# Patient Record
Sex: FEMALE | Race: BLACK OR AFRICAN AMERICAN | Employment: FULL TIME | URBAN - METROPOLITAN AREA
[De-identification: names, ages, dates, MRNs, and addresses within clinical notes are randomized per-mention and may not be internally consistent; named-entity substitution may affect disease eponyms.]

---

## 2017-02-03 ENCOUNTER — OP HISTORICAL/CONVERTED ENCOUNTER (OUTPATIENT)
Dept: OTHER | Age: 62
End: 2017-02-03

## 2017-08-02 ENCOUNTER — OP HISTORICAL/CONVERTED ENCOUNTER (OUTPATIENT)
Dept: OTHER | Age: 62
End: 2017-08-02

## 2017-09-18 ENCOUNTER — OP HISTORICAL/CONVERTED ENCOUNTER (OUTPATIENT)
Dept: OTHER | Age: 62
End: 2017-09-18

## 2017-10-02 ENCOUNTER — OP HISTORICAL/CONVERTED ENCOUNTER (OUTPATIENT)
Dept: OTHER | Age: 62
End: 2017-10-02

## 2017-11-01 ENCOUNTER — OP HISTORICAL/CONVERTED ENCOUNTER (OUTPATIENT)
Dept: OTHER | Age: 62
End: 2017-11-01

## 2017-12-01 ENCOUNTER — OP HISTORICAL/CONVERTED ENCOUNTER (OUTPATIENT)
Dept: OTHER | Age: 62
End: 2017-12-01

## 2018-08-22 ENCOUNTER — OP HISTORICAL/CONVERTED ENCOUNTER (OUTPATIENT)
Dept: OTHER | Age: 63
End: 2018-08-22

## 2019-09-03 ENCOUNTER — OP HISTORICAL/CONVERTED ENCOUNTER (OUTPATIENT)
Dept: OTHER | Age: 64
End: 2019-09-03

## 2019-09-23 ENCOUNTER — OP HISTORICAL/CONVERTED ENCOUNTER (OUTPATIENT)
Dept: OTHER | Age: 64
End: 2019-09-23

## 2019-10-02 ENCOUNTER — OP HISTORICAL/CONVERTED ENCOUNTER (OUTPATIENT)
Dept: OTHER | Age: 64
End: 2019-10-02

## 2019-12-03 ENCOUNTER — OP HISTORICAL/CONVERTED ENCOUNTER (OUTPATIENT)
Dept: OTHER | Age: 64
End: 2019-12-03

## 2020-01-14 ENCOUNTER — OP HISTORICAL/CONVERTED ENCOUNTER (OUTPATIENT)
Dept: OTHER | Age: 65
End: 2020-01-14

## 2020-02-03 ENCOUNTER — OP HISTORICAL/CONVERTED ENCOUNTER (OUTPATIENT)
Dept: OTHER | Age: 65
End: 2020-02-03

## 2020-07-18 VITALS
SYSTOLIC BLOOD PRESSURE: 130 MMHG | BODY MASS INDEX: 33.82 KG/M2 | RESPIRATION RATE: 18 BRPM | OXYGEN SATURATION: 98 % | HEIGHT: 65 IN | DIASTOLIC BLOOD PRESSURE: 78 MMHG | WEIGHT: 203 LBS | HEART RATE: 66 BPM

## 2020-07-18 PROBLEM — H92.09 OTALGIA: Status: ACTIVE | Noted: 2020-07-18

## 2020-07-18 PROBLEM — J34.3 HYPERTROPHY OF NASAL TURBINATES: Status: ACTIVE | Noted: 2020-07-18

## 2020-07-18 PROBLEM — J30.1 ALLERGIC RHINITIS DUE TO POLLEN: Status: ACTIVE | Noted: 2020-07-18

## 2020-07-18 PROBLEM — H61.20 IMPACTED CERUMEN: Status: ACTIVE | Noted: 2020-07-18

## 2020-07-18 PROBLEM — R11.0 NAUSEA: Status: ACTIVE | Noted: 2020-07-18

## 2020-07-18 PROBLEM — H81.09 MENIERE'S DISEASE: Status: ACTIVE | Noted: 2020-07-18

## 2020-07-18 PROBLEM — G43.109 MIGRAINE WITH VERTIGO: Status: ACTIVE | Noted: 2020-07-18

## 2020-07-18 PROBLEM — H90.3 ASYMMETRICAL SENSORINEURAL HEARING LOSS: Status: ACTIVE | Noted: 2020-07-18

## 2020-07-18 RX ORDER — ONDANSETRON 4 MG/1
4 TABLET, FILM COATED ORAL
COMMUNITY
End: 2021-03-17

## 2020-07-18 RX ORDER — MOMETASONE FUROATE 50 UG/1
2 SPRAY, METERED NASAL DAILY
COMMUNITY
End: 2021-03-17

## 2020-07-18 RX ORDER — AZELASTINE 1 MG/ML
1 SPRAY, METERED NASAL 2 TIMES DAILY
COMMUNITY
End: 2021-09-03

## 2020-07-18 RX ORDER — SIMVASTATIN 40 MG/1
TABLET, FILM COATED ORAL
COMMUNITY

## 2020-07-18 RX ORDER — LEVOCETIRIZINE DIHYDROCHLORIDE 5 MG/1
TABLET, FILM COATED ORAL
COMMUNITY
End: 2020-10-13 | Stop reason: SDUPTHER

## 2020-07-18 RX ORDER — ANASTROZOLE 1 MG/1
1 TABLET ORAL DAILY
COMMUNITY

## 2020-07-18 RX ORDER — BENZONATATE 100 MG/1
100 CAPSULE ORAL
COMMUNITY

## 2020-07-18 RX ORDER — TRIAMTERENE/HYDROCHLOROTHIAZID 37.5-25 MG
TABLET ORAL DAILY
COMMUNITY

## 2020-07-18 RX ORDER — SYRINGE WITH NEEDLE, 1 ML 28GX1/2"
SYRINGE, EMPTY DISPOSABLE MISCELLANEOUS
COMMUNITY

## 2020-07-18 RX ORDER — HYDROCHLOROTHIAZIDE 12.5 MG/1
12.5 CAPSULE ORAL DAILY
COMMUNITY

## 2020-07-18 RX ORDER — MECLIZINE HCL 12.5 MG 12.5 MG/1
TABLET ORAL
COMMUNITY
End: 2020-08-25 | Stop reason: SDUPTHER

## 2020-07-18 RX ORDER — OMEPRAZOLE 20 MG/1
20 CAPSULE, DELAYED RELEASE ORAL DAILY
COMMUNITY

## 2020-07-18 RX ORDER — DESLORATADINE 2.5 MG/1
2.5 TABLET, ORALLY DISINTEGRATING ORAL DAILY
COMMUNITY
End: 2020-10-13 | Stop reason: SDUPTHER

## 2020-07-21 ENCOUNTER — OFFICE VISIT (OUTPATIENT)
Dept: ENT CLINIC | Age: 65
End: 2020-07-21
Payer: MEDICARE

## 2020-07-21 VITALS
BODY MASS INDEX: 33.82 KG/M2 | OXYGEN SATURATION: 98 % | TEMPERATURE: 97.3 F | HEIGHT: 65 IN | WEIGHT: 203 LBS | RESPIRATION RATE: 18 BRPM | HEART RATE: 66 BPM

## 2020-07-21 DIAGNOSIS — J30.1 SEASONAL ALLERGIC RHINITIS DUE TO POLLEN: Primary | ICD-10-CM

## 2020-07-21 PROCEDURE — 95117 IMMUNOTHERAPY INJECTIONS: CPT | Performed by: OTOLARYNGOLOGY

## 2020-07-23 NOTE — PROGRESS NOTES
Patannabellamt seen 07/22/20 for vial test, patient administered 0.02ml, 1 vial, SANTI 7 mm reaction

## 2020-08-24 ENCOUNTER — PATIENT MESSAGE (OUTPATIENT)
Dept: ENT CLINIC | Age: 65
End: 2020-08-24

## 2020-08-25 RX ORDER — MECLIZINE HCL 12.5 MG 12.5 MG/1
25 TABLET ORAL
Qty: 180 TAB | Refills: 1 | Status: SHIPPED | OUTPATIENT
Start: 2020-08-25 | End: 2020-11-23

## 2020-08-25 NOTE — TELEPHONE ENCOUNTER
Zohaib Doan LPN 2/95/3910 6:54 AM EDT      ----- Message -----  From: Kayy Mast  Sent: 8/25/2020 9:30 AM EDT  To: Rep Nurse  Subject: FW: Prescription Question       ----- Message -----  From: Oksana Brown  Sent: 8/24/2020 5:53 PM EDT  To: [facility] Mychart Admin Pool  Subject: Prescription Question     I need a refill on meclizine sent to [facility].  Almost out

## 2020-09-16 ENCOUNTER — TELEPHONE (OUTPATIENT)
Dept: ENT CLINIC | Age: 65
End: 2020-09-16

## 2020-09-16 NOTE — TELEPHONE ENCOUNTER
Called patient and notified her that her serum has been remixed. Scheduled an appointment for patient to come in next week.

## 2020-09-25 ENCOUNTER — OFFICE VISIT (OUTPATIENT)
Dept: ENT CLINIC | Age: 65
End: 2020-09-25
Payer: MEDICARE

## 2020-09-25 VITALS — SYSTOLIC BLOOD PRESSURE: 140 MMHG | DIASTOLIC BLOOD PRESSURE: 72 MMHG

## 2020-09-25 DIAGNOSIS — J30.1 SEASONAL ALLERGIC RHINITIS DUE TO POLLEN: Primary | ICD-10-CM

## 2020-09-25 PROCEDURE — 95115 IMMUNOTHERAPY ONE INJECTION: CPT | Performed by: OTOLARYNGOLOGY

## 2020-10-12 ENCOUNTER — TELEPHONE (OUTPATIENT)
Dept: ENT CLINIC | Age: 65
End: 2020-10-12

## 2020-10-13 RX ORDER — LEVOCETIRIZINE DIHYDROCHLORIDE 5 MG/1
5 TABLET, FILM COATED ORAL DAILY
Qty: 90 TAB | Refills: 1 | Status: SHIPPED | OUTPATIENT
Start: 2020-10-13 | End: 2021-03-15

## 2020-11-20 ENCOUNTER — OFFICE VISIT (OUTPATIENT)
Dept: ENT CLINIC | Age: 65
End: 2020-11-20
Payer: MEDICARE

## 2020-11-20 VITALS
TEMPERATURE: 97.8 F | RESPIRATION RATE: 18 BRPM | HEIGHT: 65 IN | HEART RATE: 85 BPM | BODY MASS INDEX: 33.82 KG/M2 | WEIGHT: 203 LBS | SYSTOLIC BLOOD PRESSURE: 120 MMHG | OXYGEN SATURATION: 97 % | DIASTOLIC BLOOD PRESSURE: 76 MMHG

## 2020-11-20 DIAGNOSIS — J34.3 HYPERTROPHY OF NASAL TURBINATES: ICD-10-CM

## 2020-11-20 DIAGNOSIS — H81.02 MENIERE'S DISEASE OF LEFT EAR: Primary | ICD-10-CM

## 2020-11-20 DIAGNOSIS — H61.23 BILATERAL IMPACTED CERUMEN: ICD-10-CM

## 2020-11-20 DIAGNOSIS — J30.1 NON-SEASONAL ALLERGIC RHINITIS DUE TO POLLEN: ICD-10-CM

## 2020-11-20 PROCEDURE — 69210 REMOVE IMPACTED EAR WAX UNI: CPT | Performed by: OTOLARYNGOLOGY

## 2020-11-20 NOTE — LETTER
11/20/20 Patient: Aletha Weeks YOB: 1955 Date of Visit: 11/20/2020 Letitia Ng, [address] [address] VIA Facsimile: [phone] Dear Letitia Ng MD, Thank you for referring Ms. Viridiana Boone to [facility] EAR, NOSE, THROAT AND ALLERGY CARE for evaluation. My notes for this consultation are attached. If you have questions, please do not hesitate to call me. I look forward to following your patient along with you. Sincerely, Ileana Balderas MD

## 2020-11-20 NOTE — PROGRESS NOTES
Subjective:    Loura Congo   72 y.o.   1955       f/u Meniere disease  pt has overall been doing ok  still on daily dyazide and nightly meclizine 12.5mg  occ woozy feeling but no major flares of vertigo  does c/o some mild sinus tightness, mild PND    had another flare of laryngitis last week  better now  this tends to occur 2-3 times/year    update 6/27/19 - pt had allergy testing today; here to discuss results    update 12/12/19 - 6 mos f/u pt has been doing overall well; she has been dx with breast CA having additional surgery then XRT; has been stable from allergy shots and her MD    11/20/20 - nearly 1 year f/u MD and allergies, pt is on IT, overall doing well, MD is under good control on daily dyazide and meclizine 12.5 daily      Review of Systems  Review of Systems   Constitutional: Negative for chills and fever. HENT: Positive for congestion and hearing loss. Negative for ear pain, nosebleeds and tinnitus. Eyes: Negative for blurred vision and double vision. Respiratory: Negative for cough, sputum production and shortness of breath. Cardiovascular: Negative for chest pain and palpitations. Gastrointestinal: Negative for heartburn, nausea and vomiting. Musculoskeletal: Negative for joint pain and neck pain. Skin: Negative. Neurological: Positive for dizziness. Negative for speech change, weakness and headaches. Endo/Heme/Allergies: Positive for environmental allergies. Does not bruise/bleed easily. Psychiatric/Behavioral: Negative for memory loss. The patient does not have insomnia.           Past Medical History:   Diagnosis Date    Allergic rhinitis due to pollen 7/18/2020    Asymmetrical sensorineural hearing loss 7/18/2020    History of multiple allergies     Hypertrophy of nasal turbinates 7/18/2020    Impacted cerumen 7/18/2020    Meniere's disease 7/18/2020    Migraine with vertigo 7/18/2020    Nausea 7/18/2020    Otalgia 7/18/2020     Prior to Admission medications    Medication Sig Start Date End Date Taking? Authorizing Provider   levocetirizine (XYZAL) 5 mg tablet Take 1 Tab by mouth daily. 10/13/20   Jesusita De La Rosa MD   meclizine (ANTIVERT) 12.5 mg tablet Take 2 Tabs by mouth two (2) times daily as needed for Dizziness for up to 90 days. 8/25/20 11/23/20  Jesusita De La Rosa MD   anastrozole (ARIMIDEX) 1 mg tablet Take 1 mg by mouth daily. Provider, Historical   azelastine (ASTELIN) 137 mcg (0.1 %) nasal spray 1 Spray two (2) times a day. Use in each nostril as directed    Provider, Historical   Syringe with Needle,Disp, Tray (Allergist Tray 1/2cc 27Gx1/2\") 1/2 mL 27 x 1/2\" tray by Does Not Apply route. Provider, Historical   benzonatate (TESSALON) 100 mg capsule Take 100 mg by mouth three (3) times daily as needed for Cough. Provider, Historical   EPINEPHrine 0.15 mg/0.3 mL syrg by Injection route. Provider, Historical   hydroCHLOROthiazide (MICROZIDE) 12.5 mg capsule Take 12.5 mg by mouth daily. Provider, Historical   mometasone (NASONEX) 50 mcg/actuation nasal spray 2 Sprays daily. Provider, Historical   omeprazole (PRILOSEC) 20 mg capsule Take 20 mg by mouth daily. Provider, Historical   ondansetron hcl (ZOFRAN) 4 mg tablet Take 4 mg by mouth every eight (8) hours as needed for Nausea or Vomiting. Provider, Historical   simvastatin (ZOCOR) 40 mg tablet Take  by mouth nightly. Provider, Historical   triamterene-hydroCHLOROthiazide (MAXZIDE) 37.5-25 mg per tablet Take  by mouth daily. Provider, Historical   rivaroxaban (Xarelto) 10 mg tablet Take  by mouth daily. Provider, Historical            Objective:     Visit Vitals  /76 (BP 1 Location: Left arm, BP Patient Position: Sitting)   Pulse 85   Temp 97.8 °F (36.6 °C) (Oral)   Resp 18   Ht 5' 4.5\" (1.638 m)   Wt 203 lb (92.1 kg)   SpO2 97%   BMI 34.31 kg/m²        Physical Exam  Vitals signs reviewed. Constitutional:       General: She is awake. She is not in acute distress. Appearance: Normal appearance. She is well-groomed. She is obese. HENT:      Head: Normocephalic and atraumatic. Jaw: There is normal jaw occlusion. No trismus, tenderness or malocclusion. Salivary Glands: Right salivary gland is not diffusely enlarged or tender. Left salivary gland is not diffusely enlarged or tender. Right Ear: Tympanic membrane, ear canal and external ear normal. Decreased hearing noted. A PE tube is present. Left Ear: Tympanic membrane, ear canal and external ear normal. Decreased hearing noted. A PE tube is present. Ears:      Waters exam findings: does not lateralize. Right Rinne: AC > BC. Left Rinne: AC > BC. Nose: No nasal deformity, septal deviation or mucosal edema. Right Nostril: No epistaxis. Left Nostril: No epistaxis. Right Turbinates: Enlarged and swollen. Not pale. Left Turbinates: Enlarged and swollen. Not pale. Right Sinus: No maxillary sinus tenderness or frontal sinus tenderness. Left Sinus: No maxillary sinus tenderness or frontal sinus tenderness. Mouth/Throat:      Lips: Pink. No lesions. Mouth: Mucous membranes are moist. No oral lesions. Dentition: Normal dentition. No dental caries. Tongue: No lesions. Palate: No mass and lesions. Pharynx: Oropharynx is clear. Uvula midline. No oropharyngeal exudate or posterior oropharyngeal erythema. Tonsils: No tonsillar exudate. 0 on the right. 0 on the left. Eyes:      General: Vision grossly intact. Extraocular Movements: Extraocular movements intact. Right eye: No nystagmus. Left eye: No nystagmus. Conjunctiva/sclera: Conjunctivae normal.      Pupils: Pupils are equal, round, and reactive to light. Neck:      Musculoskeletal: No edema or erythema. Thyroid: No thyroid mass, thyromegaly or thyroid tenderness. Trachea: Trachea and phonation normal. No tracheal tenderness or tracheal deviation. Cardiovascular:      Rate and Rhythm: Normal rate and regular rhythm. Pulmonary:      Effort: Pulmonary effort is normal. No respiratory distress. Breath sounds: No stridor. Musculoskeletal: Normal range of motion. General: No swelling or tenderness. Lymphadenopathy:      Cervical: No cervical adenopathy. Skin:     General: Skin is warm and dry. Findings: No lesion or rash. Neurological:      General: No focal deficit present. Mental Status: She is alert and oriented to person, place, and time. Mental status is at baseline. Cranial Nerves: Cranial nerves are intact. Coordination: Romberg sign negative. Gait: Gait is intact. Comments: Negative Hallpike   Psychiatric:         Mood and Affect: Mood normal.         Behavior: Behavior normal. Behavior is cooperative. Procedure - Removal Impacted Cerumen    Indications: cerumen impaction    Ears are examined under microscope/headlight.  bilateral ears are cleaned using otologic curette, suction, and/or alligator forceps. Assessment/Plan:     Encounter Diagnoses   Name Primary?  Meniere's disease of left ear Yes    Non-seasonal allergic rhinitis due to pollen     Hypertrophy of nasal turbinates     Bilateral impacted cerumen      MD is stable, cont current tx  Cont IT and breakthrough meds  Ears cleaned AU  Fu 6 mos    No orders of the defined types were placed in this encounter. Follow-up and Dispositions    · Return in about 6 months (around 5/20/2021).

## 2020-12-15 ENCOUNTER — TELEPHONE (OUTPATIENT)
Dept: ENT CLINIC | Age: 65
End: 2020-12-15

## 2020-12-15 NOTE — TELEPHONE ENCOUNTER
Spoke with patient informed her serum will be made Friday, she made an appointment for Tuesday @ 11:15 am.

## 2020-12-22 ENCOUNTER — OFFICE VISIT (OUTPATIENT)
Dept: ENT CLINIC | Age: 65
End: 2020-12-22
Payer: MEDICARE

## 2020-12-22 VITALS — DIASTOLIC BLOOD PRESSURE: 80 MMHG | TEMPERATURE: 97.1 F | SYSTOLIC BLOOD PRESSURE: 120 MMHG

## 2020-12-22 DIAGNOSIS — J30.1 NON-SEASONAL ALLERGIC RHINITIS DUE TO POLLEN: Primary | ICD-10-CM

## 2020-12-22 PROCEDURE — 95115 IMMUNOTHERAPY ONE INJECTION: CPT | Performed by: OTOLARYNGOLOGY

## 2021-02-23 ENCOUNTER — TELEPHONE (OUTPATIENT)
Dept: ENT CLINIC | Age: 66
End: 2021-02-23

## 2021-02-25 ENCOUNTER — TELEPHONE (OUTPATIENT)
Dept: ENT CLINIC | Age: 66
End: 2021-02-25

## 2021-03-01 ENCOUNTER — TELEPHONE (OUTPATIENT)
Dept: ENT CLINIC | Age: 66
End: 2021-03-01

## 2021-03-01 NOTE — TELEPHONE ENCOUNTER
Called patient, DAPHNEY saying 2:15 March 17th could work for vial test. If that works she can give the office back a call and place her at 2:15, and if not she can give a call back for schedule for a new time/date.

## 2021-03-12 ENCOUNTER — HOSPITAL ENCOUNTER (OUTPATIENT)
Dept: RADIATION THERAPY | Age: 66
Discharge: HOME | End: 2021-03-12

## 2021-03-15 RX ORDER — LEVOCETIRIZINE DIHYDROCHLORIDE 5 MG/1
TABLET, FILM COATED ORAL
Qty: 90 TAB | Refills: 1 | Status: SHIPPED | OUTPATIENT
Start: 2021-03-15 | End: 2021-09-03

## 2021-03-17 ENCOUNTER — OFFICE VISIT (OUTPATIENT)
Dept: ENT CLINIC | Age: 66
End: 2021-03-17
Payer: MEDICARE

## 2021-03-17 VITALS
DIASTOLIC BLOOD PRESSURE: 80 MMHG | RESPIRATION RATE: 18 BRPM | OXYGEN SATURATION: 98 % | HEART RATE: 90 BPM | SYSTOLIC BLOOD PRESSURE: 148 MMHG | TEMPERATURE: 96.9 F

## 2021-03-17 DIAGNOSIS — J30.1 NON-SEASONAL ALLERGIC RHINITIS DUE TO POLLEN: Primary | ICD-10-CM

## 2021-03-17 PROCEDURE — 95115 IMMUNOTHERAPY ONE INJECTION: CPT | Performed by: OTOLARYNGOLOGY

## 2021-03-17 NOTE — PROGRESS NOTES
Visit Vitals  BP (!) 148/80 (BP 1 Location: Right arm, BP Patient Position: Sitting, BP Cuff Size: Adult)   Pulse 90   Temp 96.9 °F (36.1 °C) (Temporal)   Resp 18   SpO2 98%       Patient received 0.02ml of allergy serum from vial one in the [facility]. Patient had a 7mm reaction.

## 2021-03-18 DIAGNOSIS — H81.02 MENIERE'S DISEASE OF LEFT EAR: Primary | ICD-10-CM

## 2021-03-18 RX ORDER — MECLIZINE HCL 12.5 MG 12.5 MG/1
12.5 TABLET ORAL
Qty: 180 TAB | Refills: 1 | Status: SHIPPED | OUTPATIENT
Start: 2021-03-18 | End: 2022-07-11 | Stop reason: SDUPTHER

## 2021-05-21 ENCOUNTER — TELEPHONE (OUTPATIENT)
Dept: ENT CLINIC | Age: 66
End: 2021-05-21

## 2021-05-21 ENCOUNTER — OFFICE VISIT (OUTPATIENT)
Dept: ENT CLINIC | Age: 66
End: 2021-05-21
Payer: MEDICARE

## 2021-05-21 VITALS
OXYGEN SATURATION: 98 % | TEMPERATURE: 97.2 F | SYSTOLIC BLOOD PRESSURE: 124 MMHG | WEIGHT: 203 LBS | RESPIRATION RATE: 18 BRPM | DIASTOLIC BLOOD PRESSURE: 80 MMHG | HEART RATE: 74 BPM | BODY MASS INDEX: 33.82 KG/M2 | HEIGHT: 65 IN

## 2021-05-21 DIAGNOSIS — H81.02 MENIERE'S DISEASE OF LEFT EAR: ICD-10-CM

## 2021-05-21 DIAGNOSIS — J34.3 HYPERTROPHY OF NASAL TURBINATES: ICD-10-CM

## 2021-05-21 DIAGNOSIS — J30.1 NON-SEASONAL ALLERGIC RHINITIS DUE TO POLLEN: Primary | ICD-10-CM

## 2021-05-21 DIAGNOSIS — H90.3 ASYMMETRICAL SENSORINEURAL HEARING LOSS: ICD-10-CM

## 2021-05-21 DIAGNOSIS — H61.23 BILATERAL IMPACTED CERUMEN: ICD-10-CM

## 2021-05-21 PROCEDURE — 99214 OFFICE O/P EST MOD 30 MIN: CPT | Performed by: OTOLARYNGOLOGY

## 2021-05-21 PROCEDURE — G8510 SCR DEP NEG, NO PLAN REQD: HCPCS | Performed by: OTOLARYNGOLOGY

## 2021-05-21 PROCEDURE — 3017F COLORECTAL CA SCREEN DOC REV: CPT | Performed by: OTOLARYNGOLOGY

## 2021-05-21 PROCEDURE — G8417 CALC BMI ABV UP PARAM F/U: HCPCS | Performed by: OTOLARYNGOLOGY

## 2021-05-21 PROCEDURE — 69210 REMOVE IMPACTED EAR WAX UNI: CPT | Performed by: OTOLARYNGOLOGY

## 2021-05-21 PROCEDURE — G8400 PT W/DXA NO RESULTS DOC: HCPCS | Performed by: OTOLARYNGOLOGY

## 2021-05-21 PROCEDURE — 1090F PRES/ABSN URINE INCON ASSESS: CPT | Performed by: OTOLARYNGOLOGY

## 2021-05-21 PROCEDURE — 1101F PT FALLS ASSESS-DOCD LE1/YR: CPT | Performed by: OTOLARYNGOLOGY

## 2021-05-21 PROCEDURE — G8536 NO DOC ELDER MAL SCRN: HCPCS | Performed by: OTOLARYNGOLOGY

## 2021-05-21 PROCEDURE — G8427 DOCREV CUR MEDS BY ELIG CLIN: HCPCS | Performed by: OTOLARYNGOLOGY

## 2021-05-21 NOTE — PROGRESS NOTES
Subjective:    Alley Edmond   77 y.o.   1955     Followup visit    Original HPI  f/u Meniere disease  pt has overall been doing ok  still on daily dyazide and nightly meclizine 12.5mg  occ woozy feeling but no major flares of vertigo  does c/o some mild sinus tightness, mild PND    had another flare of laryngitis last week  better now  this tends to occur 2-3 times/year    Followup HPI   update 6/27/19 - pt had allergy testing today; here to discuss results    update 12/12/19 - 6 mos f/u pt has been doing overall well; she has been dx with breast CA having additional surgery then XRT; has been stable from allergy shots and her MD    11/20/20 - nearly 1 year f/u MD and allergies, pt is on IT, overall doing well, MD is under good control on daily dyazide and meclizine 12.5 daily    5/21/20 - 6 mos f/u - pt doing well on IT; still on buildup; on daily dyazide, and meclizine 12.5mg nightly    Review of Systems  Review of Systems   Constitutional: Negative for chills and fever. HENT: Positive for congestion and hearing loss. Negative for ear pain, nosebleeds and tinnitus. Eyes: Negative for blurred vision and double vision. Respiratory: Negative for cough, sputum production and shortness of breath. Cardiovascular: Negative for chest pain and palpitations. Gastrointestinal: Negative for heartburn, nausea and vomiting. Musculoskeletal: Negative for joint pain and neck pain. Skin: Negative. Neurological: Positive for dizziness. Negative for speech change, weakness and headaches. Endo/Heme/Allergies: Positive for environmental allergies. Does not bruise/bleed easily. Psychiatric/Behavioral: Negative for memory loss. The patient does not have insomnia.           Past Medical History:   Diagnosis Date    Allergic rhinitis due to pollen 7/18/2020    Asymmetrical sensorineural hearing loss 7/18/2020    History of multiple allergies     Hypertrophy of nasal turbinates 7/18/2020    Impacted cerumen 7/18/2020    Meniere's disease 7/18/2020    Migraine with vertigo 7/18/2020    Nausea 7/18/2020    Otalgia 7/18/2020     Prior to Admission medications    Medication Sig Start Date End Date Taking? Authorizing Provider   meclizine (ANTIVERT) 12.5 mg tablet Take 1 Tab by mouth three (3) times daily as needed for Dizziness. 3/18/21   Riccardo Johnson, MD   levocetirizine (XYZAL) 5 mg tablet TAKE 1 TABLET BY MOUTH  DAILY 3/15/21   Riccardo Johnson, MD   anastrozole (ARIMIDEX) 1 mg tablet Take 1 mg by mouth daily. Provider, Historical   azelastine (ASTELIN) 137 mcg (0.1 %) nasal spray 1 Spray two (2) times a day. Use in each nostril as directed    Provider, Historical   Syringe with Needle,Disp, Tray (Allergist Tray 1/2cc 27Gx1/2\") 1/2 mL 27 x 1/2\" tray by Does Not Apply route. Provider, Historical   benzonatate (TESSALON) 100 mg capsule Take 100 mg by mouth three (3) times daily as needed for Cough. Provider, Historical   EPINEPHrine 0.15 mg/0.3 mL syrg by Injection route. Provider, Historical   hydroCHLOROthiazide (MICROZIDE) 12.5 mg capsule Take 12.5 mg by mouth daily. Provider, Historical   omeprazole (PRILOSEC) 20 mg capsule Take 20 mg by mouth daily. Provider, Historical   simvastatin (ZOCOR) 40 mg tablet Take  by mouth nightly. Provider, Historical   triamterene-hydroCHLOROthiazide (MAXZIDE) 37.5-25 mg per tablet Take  by mouth daily. Provider, Historical   rivaroxaban (Xarelto) 10 mg tablet Take  by mouth daily. Provider, Historical            Objective:     Visit Vitals  /80 (BP 1 Location: Left upper arm, BP Patient Position: Sitting, BP Cuff Size: Adult)   Pulse 74   Temp 97.2 °F (36.2 °C) (Temporal)   Resp 18   Ht 5' 4.5\" (1.638 m)   Wt 203 lb (92.1 kg)   SpO2 98%   BMI 34.31 kg/m²        Physical Exam  Vitals reviewed. Constitutional:       General: She is awake. She is not in acute distress. Appearance: Normal appearance. She is well-groomed. She is obese.    HENT: Head: Normocephalic and atraumatic. Jaw: There is normal jaw occlusion. No trismus, tenderness or malocclusion. Salivary Glands: Right salivary gland is not diffusely enlarged or tender. Left salivary gland is not diffusely enlarged or tender. Right Ear: Tympanic membrane, ear canal and external ear normal. Decreased hearing noted. There is impacted cerumen. Left Ear: Tympanic membrane, ear canal and external ear normal. Decreased hearing noted. There is impacted cerumen. Ears:      Waters exam findings: does not lateralize. Right Rinne: AC > BC. Left Rinne: AC > BC. Nose: No nasal deformity, septal deviation or mucosal edema. Right Nostril: No epistaxis. Left Nostril: No epistaxis. Right Turbinates: Enlarged and swollen. Not pale. Left Turbinates: Enlarged and swollen. Not pale. Right Sinus: No maxillary sinus tenderness or frontal sinus tenderness. Left Sinus: No maxillary sinus tenderness or frontal sinus tenderness. Mouth/Throat:      Lips: Pink. No lesions. Mouth: Mucous membranes are moist. No oral lesions. Dentition: Normal dentition. No dental caries. Tongue: No lesions. Palate: No mass and lesions. Pharynx: Oropharynx is clear. Uvula midline. No oropharyngeal exudate or posterior oropharyngeal erythema. Tonsils: No tonsillar exudate. 0 on the right. 0 on the left. Eyes:      General: Vision grossly intact. Extraocular Movements: Extraocular movements intact. Right eye: No nystagmus. Left eye: No nystagmus. Conjunctiva/sclera: Conjunctivae normal.      Pupils: Pupils are equal, round, and reactive to light. Neck:      Thyroid: No thyroid mass, thyromegaly or thyroid tenderness. Trachea: Trachea and phonation normal. No tracheal tenderness or tracheal deviation. Cardiovascular:      Rate and Rhythm: Normal rate and regular rhythm.    Pulmonary:      Effort: Pulmonary effort is normal. No respiratory distress. Breath sounds: No stridor. Musculoskeletal:         General: No swelling or tenderness. Normal range of motion. Cervical back: No edema or erythema. Lymphadenopathy:      Cervical: No cervical adenopathy. Skin:     General: Skin is warm and dry. Findings: No lesion or rash. Neurological:      General: No focal deficit present. Mental Status: She is alert and oriented to person, place, and time. Mental status is at baseline. Cranial Nerves: Cranial nerves are intact. Coordination: Romberg sign negative. Gait: Gait is intact. Psychiatric:         Mood and Affect: Mood normal.         Behavior: Behavior normal. Behavior is cooperative. Procedure - Removal Impacted Cerumen    Indications: cerumen impaction    Ears are examined under microscope/headlight.  bilateral ears are cleaned using otologic curette, suction, and/or alligator forceps. Assessment/Plan:     Encounter Diagnoses   Name Primary?  Non-seasonal allergic rhinitis due to pollen Yes    Hypertrophy of nasal turbinates     Asymmetrical sensorineural hearing loss     Bilateral impacted cerumen     Meniere's disease of left ear      MD is stable, cont current tx  Cont IT and breakthrough meds; should be able to go to biweekly once she finished the next 10 week escalation. Ears cleaned AU  Fu 6 mos    Orders Placed This Encounter    REMOVE IMPACTED EAR WAX     Follow-up and Dispositions    · Return in about 6 months (around 11/21/2021).

## 2021-05-21 NOTE — PROGRESS NOTES
Visit Vitals  /80 (BP 1 Location: Left upper arm, BP Patient Position: Sitting, BP Cuff Size: Adult)   Pulse 74   Temp 97.2 °F (36.2 °C) (Temporal)   Resp 18   Ht 5' 4.5\" (1.638 m)   Wt 203 lb (92.1 kg)   SpO2 98%   BMI 34.31 kg/m²     Chief Complaint   Patient presents with    Follow-up     Non-seasonal allergic rhinitis due to pollen

## 2021-06-04 ENCOUNTER — OFFICE VISIT (OUTPATIENT)
Dept: ENT CLINIC | Age: 66
End: 2021-06-04
Payer: MEDICARE

## 2021-06-04 VITALS — DIASTOLIC BLOOD PRESSURE: 78 MMHG | HEART RATE: 85 BPM | SYSTOLIC BLOOD PRESSURE: 140 MMHG | OXYGEN SATURATION: 98 %

## 2021-06-04 DIAGNOSIS — J30.1 NON-SEASONAL ALLERGIC RHINITIS DUE TO POLLEN: Primary | ICD-10-CM

## 2021-06-04 PROCEDURE — 95115 IMMUNOTHERAPY ONE INJECTION: CPT | Performed by: OTOLARYNGOLOGY

## 2021-08-10 ENCOUNTER — TELEPHONE (OUTPATIENT)
Dept: ENT CLINIC | Age: 66
End: 2021-08-10

## 2021-08-31 ENCOUNTER — OFFICE VISIT (OUTPATIENT)
Dept: ENT CLINIC | Age: 66
End: 2021-08-31
Payer: MEDICARE

## 2021-08-31 VITALS — DIASTOLIC BLOOD PRESSURE: 74 MMHG | HEART RATE: 76 BPM | SYSTOLIC BLOOD PRESSURE: 130 MMHG | OXYGEN SATURATION: 97 %

## 2021-08-31 DIAGNOSIS — J30.89 ALLERGIC RHINITIS DUE TO OTHER ALLERGIC TRIGGER, UNSPECIFIED SEASONALITY: Primary | ICD-10-CM

## 2021-08-31 PROCEDURE — 95115 IMMUNOTHERAPY ONE INJECTION: CPT | Performed by: OTOLARYNGOLOGY

## 2021-09-03 RX ORDER — AZELASTINE 1 MG/ML
SPRAY, METERED NASAL
Qty: 1 EACH | Refills: 3 | Status: SHIPPED | OUTPATIENT
Start: 2021-09-03 | End: 2022-02-18 | Stop reason: SDUPTHER

## 2021-09-03 RX ORDER — LEVOCETIRIZINE DIHYDROCHLORIDE 5 MG/1
TABLET, FILM COATED ORAL
Qty: 90 TABLET | Refills: 1 | Status: SHIPPED | OUTPATIENT
Start: 2021-09-03 | End: 2022-01-05

## 2021-09-17 ENCOUNTER — HOSPITAL ENCOUNTER (OUTPATIENT)
Dept: RADIATION THERAPY | Age: 66
Discharge: HOME | End: 2021-09-17

## 2021-10-08 ENCOUNTER — OFFICE VISIT (OUTPATIENT)
Dept: ENT CLINIC | Age: 66
End: 2021-10-08
Payer: MEDICARE

## 2021-10-08 VITALS — HEART RATE: 79 BPM | DIASTOLIC BLOOD PRESSURE: 72 MMHG | OXYGEN SATURATION: 97 % | SYSTOLIC BLOOD PRESSURE: 122 MMHG

## 2021-10-08 DIAGNOSIS — J30.89 ALLERGIC RHINITIS DUE TO OTHER ALLERGIC TRIGGER, UNSPECIFIED SEASONALITY: Primary | ICD-10-CM

## 2021-10-08 PROCEDURE — 95115 IMMUNOTHERAPY ONE INJECTION: CPT | Performed by: NURSE PRACTITIONER

## 2021-10-22 ENCOUNTER — OFFICE VISIT (OUTPATIENT)
Dept: ENT CLINIC | Age: 66
End: 2021-10-22
Payer: MEDICARE

## 2021-10-22 DIAGNOSIS — J30.9 ALLERGIC RHINITIS, UNSPECIFIED SEASONALITY, UNSPECIFIED TRIGGER: Primary | ICD-10-CM

## 2021-10-22 PROCEDURE — 95115 IMMUNOTHERAPY ONE INJECTION: CPT | Performed by: NURSE PRACTITIONER

## 2021-11-05 ENCOUNTER — OFFICE VISIT (OUTPATIENT)
Dept: ENT CLINIC | Age: 66
End: 2021-11-05
Payer: MEDICARE

## 2021-11-05 VITALS — HEART RATE: 70 BPM | OXYGEN SATURATION: 99 % | SYSTOLIC BLOOD PRESSURE: 116 MMHG | DIASTOLIC BLOOD PRESSURE: 72 MMHG

## 2021-11-05 DIAGNOSIS — J30.89 ALLERGIC RHINITIS DUE TO OTHER ALLERGIC TRIGGER, UNSPECIFIED SEASONALITY: Primary | ICD-10-CM

## 2021-11-05 PROCEDURE — 95115 IMMUNOTHERAPY ONE INJECTION: CPT | Performed by: NURSE PRACTITIONER

## 2021-11-10 ENCOUNTER — OFFICE VISIT (OUTPATIENT)
Dept: ENT CLINIC | Age: 66
End: 2021-11-10
Payer: MEDICARE

## 2021-11-10 VITALS
WEIGHT: 221 LBS | DIASTOLIC BLOOD PRESSURE: 82 MMHG | HEIGHT: 65 IN | BODY MASS INDEX: 36.82 KG/M2 | HEART RATE: 72 BPM | OXYGEN SATURATION: 96 % | SYSTOLIC BLOOD PRESSURE: 124 MMHG | RESPIRATION RATE: 16 BRPM | TEMPERATURE: 97.9 F

## 2021-11-10 DIAGNOSIS — J34.3 HYPERTROPHY OF NASAL TURBINATES: ICD-10-CM

## 2021-11-10 DIAGNOSIS — H81.02 MENIERE'S DISEASE OF LEFT EAR: ICD-10-CM

## 2021-11-10 DIAGNOSIS — J30.1 NON-SEASONAL ALLERGIC RHINITIS DUE TO POLLEN: Primary | ICD-10-CM

## 2021-11-10 DIAGNOSIS — H61.23 BILATERAL IMPACTED CERUMEN: ICD-10-CM

## 2021-11-10 PROCEDURE — G8427 DOCREV CUR MEDS BY ELIG CLIN: HCPCS | Performed by: OTOLARYNGOLOGY

## 2021-11-10 PROCEDURE — 1090F PRES/ABSN URINE INCON ASSESS: CPT | Performed by: OTOLARYNGOLOGY

## 2021-11-10 PROCEDURE — G8400 PT W/DXA NO RESULTS DOC: HCPCS | Performed by: OTOLARYNGOLOGY

## 2021-11-10 PROCEDURE — 1101F PT FALLS ASSESS-DOCD LE1/YR: CPT | Performed by: OTOLARYNGOLOGY

## 2021-11-10 PROCEDURE — 3017F COLORECTAL CA SCREEN DOC REV: CPT | Performed by: OTOLARYNGOLOGY

## 2021-11-10 PROCEDURE — G8510 SCR DEP NEG, NO PLAN REQD: HCPCS | Performed by: OTOLARYNGOLOGY

## 2021-11-10 PROCEDURE — G8417 CALC BMI ABV UP PARAM F/U: HCPCS | Performed by: OTOLARYNGOLOGY

## 2021-11-10 PROCEDURE — 99214 OFFICE O/P EST MOD 30 MIN: CPT | Performed by: OTOLARYNGOLOGY

## 2021-11-10 PROCEDURE — G8536 NO DOC ELDER MAL SCRN: HCPCS | Performed by: OTOLARYNGOLOGY

## 2021-11-10 PROCEDURE — 69210 REMOVE IMPACTED EAR WAX UNI: CPT | Performed by: OTOLARYNGOLOGY

## 2021-11-10 NOTE — LETTER
11/10/2021    Patient: Valentine Elaine   YOB: 1955   Date of Visit: 11/10/2021     Brennan Pennington MD  [facility] [id number]  Via Fax: [phone]    Dear Brennan Pennington MD,      Thank you for referring Ms. Viridiana Boone to [facility] EAR NOSE AND THROAT [address], [facility] AND ALLERGY CARE for evaluation. My notes for this consultation are attached. If you have questions, please do not hesitate to call me. I look forward to following your patient along with you.       Sincerely,    Carlos Rosen MD

## 2021-11-10 NOTE — PROGRESS NOTES
Visit Vitals  /82 (BP 1 Location: Left upper arm, BP Patient Position: Sitting, BP Cuff Size: Large adult)   Pulse 72   Temp 97.9 °F (36.6 °C) (Temporal)   Resp 16   Ht 5' 4.5\" (1.638 m)   Wt 221 lb (100.2 kg)   SpO2 96%   BMI 37.35 kg/m²     Chief Complaint   Patient presents with    Follow-up     Follow up on Allergic Rhinitis. Allergy shot patient. 1. Have you been to the ER, urgent care clinic since your last visit? Hospitalized since your last visit? No    2. Have you seen or consulted any other health care providers outside of the [address] [place] [address] since your last visit? Include any pap smears or colon screening.  No

## 2021-11-10 NOTE — PROGRESS NOTES
Subjective:    Denise Crocker   77 y.o.   1955     Followup visit    Original HPI  f/u Meniere disease  pt has overall been doing ok  still on daily dyazide and nightly meclizine 12.5mg  occ woozy feeling but no major flares of vertigo  does c/o some mild sinus tightness, mild PND    had another flare of laryngitis last week  better now  this tends to occur 2-3 times/year    Followup HPI   update 6/27/19 - pt had allergy testing today; here to discuss results    update 12/12/19 - 6 mos f/u pt has been doing overall well; she has been dx with breast CA having additional surgery then XRT; has been stable from allergy shots and her MD    11/20/20 - nearly 1 year f/u MD and allergies, pt is on IT, overall doing well, MD is under good control on daily dyazide and meclizine 12.5 daily    5/21/20 - 6 mos f/u - pt doing well on IT; still on buildup; on daily dyazide, and meclizine 12.5mg nightly    11/10/21 - 6 mos f/u - she is on biweekly IT for past 1-2 months - still on dyazide daily and 12.5mg meclizine at night    Review of Systems  Review of Systems   Constitutional: Negative for chills and fever. HENT: Positive for congestion and hearing loss. Negative for ear pain, nosebleeds and tinnitus. Eyes: Negative for blurred vision and double vision. Respiratory: Negative for cough, sputum production and shortness of breath. Cardiovascular: Negative for chest pain and palpitations. Gastrointestinal: Negative for heartburn, nausea and vomiting. Musculoskeletal: Negative for joint pain and neck pain. Skin: Negative. Neurological: Positive for dizziness. Negative for speech change, weakness and headaches. Endo/Heme/Allergies: Positive for environmental allergies. Does not bruise/bleed easily. Psychiatric/Behavioral: Negative for memory loss. The patient does not have insomnia.           Past Medical History:   Diagnosis Date    Allergic rhinitis due to pollen 7/18/2020    Asymmetrical sensorineural hearing loss 7/18/2020    History of multiple allergies     Hypertrophy of nasal turbinates 7/18/2020    Impacted cerumen 7/18/2020    Meniere's disease 7/18/2020    Migraine with vertigo 7/18/2020    Nausea 7/18/2020    Otalgia 7/18/2020     Prior to Admission medications    Medication Sig Start Date End Date Taking? Authorizing Provider   azelastine (ASTELIN) 137 mcg (0.1 %) nasal spray USE 2 SPRAYS NASALLY TWICE  A DAY 9/3/21  Yes Gama Truong MD   levocetirizine (XYZAL) 5 mg tablet TAKE 1 TABLET BY MOUTH  DAILY 9/3/21  Yes Kina Dunbar MD   meclizine (ANTIVERT) 12.5 mg tablet Take 1 Tab by mouth three (3) times daily as needed for Dizziness. 3/18/21  Yes Miya Truong MD   anastrozole (ARIMIDEX) 1 mg tablet Take 1 mg by mouth daily. Yes Provider, Historical   EPINEPHrine 0.15 mg/0.3 mL syrg by Injection route. Yes Provider, Historical   omeprazole (PRILOSEC) 20 mg capsule Take 20 mg by mouth daily. Yes Provider, Historical   simvastatin (ZOCOR) 40 mg tablet Take  by mouth nightly. Yes Provider, Historical   triamterene-hydroCHLOROthiazide (MAXZIDE) 37.5-25 mg per tablet Take  by mouth daily. Yes Provider, Historical   Syringe with Needle,Disp, Tray (Allergist Tray 1/2cc 27Gx1/2\") 1/2 mL 27 x 1/2\" tray by Does Not Apply route. Patient not taking: Reported on 11/10/2021    Provider, Historical   benzonatate (TESSALON) 100 mg capsule Take 100 mg by mouth three (3) times daily as needed for Cough. Patient not taking: Reported on 11/10/2021    Provider, Historical   hydroCHLOROthiazide (MICROZIDE) 12.5 mg capsule Take 12.5 mg by mouth daily. Patient not taking: Reported on 11/10/2021    Provider, Historical   rivaroxaban (Xarelto) 10 mg tablet Take  by mouth daily.   Patient not taking: Reported on 11/10/2021    Provider, Historical            Objective:     Visit Vitals  /82 (BP 1 Location: Left upper arm, BP Patient Position: Sitting, BP Cuff Size: Large adult)   Pulse 72   Temp 97.9 °F (36.6 °C) (Temporal)   Resp 16   Ht 5' 4.5\" (1.638 m)   Wt 221 lb (100.2 kg)   SpO2 96%   BMI 37.35 kg/m²        Physical Exam  Vitals reviewed. Constitutional:       General: She is awake. She is not in acute distress. Appearance: Normal appearance. She is well-groomed. She is obese. HENT:      Head: Normocephalic and atraumatic. Jaw: There is normal jaw occlusion. No trismus, tenderness or malocclusion. Salivary Glands: Right salivary gland is not diffusely enlarged or tender. Left salivary gland is not diffusely enlarged or tender. Right Ear: Tympanic membrane, ear canal and external ear normal. Decreased hearing noted. There is impacted cerumen. Left Ear: Tympanic membrane, ear canal and external ear normal. Decreased hearing noted. There is impacted cerumen. Ears:      Waters exam findings: does not lateralize. Right Rinne: AC > BC. Left Rinne: AC > BC. Nose: No nasal deformity, septal deviation or mucosal edema. Right Nostril: No epistaxis. Left Nostril: No epistaxis. Right Turbinates: Enlarged and swollen. Not pale. Left Turbinates: Enlarged and swollen. Not pale. Right Sinus: No maxillary sinus tenderness or frontal sinus tenderness. Left Sinus: No maxillary sinus tenderness or frontal sinus tenderness. Mouth/Throat:      Lips: Pink. No lesions. Mouth: Mucous membranes are moist. No oral lesions. Dentition: Normal dentition. No dental caries. Tongue: No lesions. Palate: No mass and lesions. Pharynx: Oropharynx is clear. Uvula midline. No oropharyngeal exudate or posterior oropharyngeal erythema. Tonsils: No tonsillar exudate. 0 on the right. 0 on the left. Eyes:      General: Vision grossly intact. Extraocular Movements: Extraocular movements intact. Right eye: No nystagmus. Left eye: No nystagmus.       Conjunctiva/sclera: Conjunctivae normal.      Pupils: Pupils are equal, round, and reactive to light. Neck:      Thyroid: No thyroid mass, thyromegaly or thyroid tenderness. Trachea: Trachea and phonation normal. No tracheal tenderness or tracheal deviation. Cardiovascular:      Rate and Rhythm: Normal rate and regular rhythm. Pulmonary:      Effort: Pulmonary effort is normal. No respiratory distress. Breath sounds: No stridor. Musculoskeletal:         General: No swelling or tenderness. Normal range of motion. Cervical back: No edema or erythema. Lymphadenopathy:      Cervical: No cervical adenopathy. Skin:     General: Skin is warm and dry. Findings: No lesion or rash. Neurological:      General: No focal deficit present. Mental Status: She is alert and oriented to person, place, and time. Mental status is at baseline. Cranial Nerves: Cranial nerves are intact. Coordination: Romberg sign negative. Gait: Gait is intact. Psychiatric:         Mood and Affect: Mood normal.         Behavior: Behavior normal. Behavior is cooperative. Procedure - Removal Impacted Cerumen    Indications: cerumen impaction    Ears are examined under microscope/headlight.  bilateral ears are cleaned using otologic curette, suction, and/or alligator forceps. Assessment/Plan:     Encounter Diagnoses   Name Primary?     Non-seasonal allergic rhinitis due to pollen Yes    Hypertrophy of nasal turbinates     Bilateral impacted cerumen     Meniere's disease of left ear      MD is stable, cont current tx  Cont IT and breakthrough meds; ok to keep on biweekly IT for now  Ears cleaned AU  Fu 6 mos    Orders Placed This Encounter    REMOVE IMPACTED EAR WAX

## 2021-11-19 ENCOUNTER — OFFICE VISIT (OUTPATIENT)
Dept: ENT CLINIC | Age: 66
End: 2021-11-19
Payer: MEDICARE

## 2021-11-19 VITALS — HEART RATE: 88 BPM | DIASTOLIC BLOOD PRESSURE: 70 MMHG | OXYGEN SATURATION: 99 % | SYSTOLIC BLOOD PRESSURE: 124 MMHG

## 2021-11-19 DIAGNOSIS — J30.89 ALLERGIC RHINITIS DUE TO OTHER ALLERGIC TRIGGER, UNSPECIFIED SEASONALITY: Primary | ICD-10-CM

## 2021-11-19 PROCEDURE — 95115 IMMUNOTHERAPY ONE INJECTION: CPT | Performed by: NURSE PRACTITIONER

## 2021-12-03 ENCOUNTER — OFFICE VISIT (OUTPATIENT)
Dept: ENT CLINIC | Age: 66
End: 2021-12-03
Payer: MEDICARE

## 2021-12-03 VITALS — HEART RATE: 86 BPM | OXYGEN SATURATION: 95 % | SYSTOLIC BLOOD PRESSURE: 126 MMHG | DIASTOLIC BLOOD PRESSURE: 68 MMHG

## 2021-12-03 DIAGNOSIS — J30.89 ALLERGIC RHINITIS DUE TO OTHER ALLERGIC TRIGGER, UNSPECIFIED SEASONALITY: Primary | ICD-10-CM

## 2021-12-03 PROCEDURE — 95115 IMMUNOTHERAPY ONE INJECTION: CPT | Performed by: NURSE PRACTITIONER

## 2021-12-17 ENCOUNTER — OFFICE VISIT (OUTPATIENT)
Dept: ENT CLINIC | Age: 66
End: 2021-12-17
Payer: MEDICARE

## 2021-12-17 VITALS — SYSTOLIC BLOOD PRESSURE: 142 MMHG | DIASTOLIC BLOOD PRESSURE: 80 MMHG | OXYGEN SATURATION: 96 % | HEART RATE: 84 BPM

## 2021-12-17 DIAGNOSIS — J30.89 ALLERGIC RHINITIS DUE TO OTHER ALLERGIC TRIGGER, UNSPECIFIED SEASONALITY: Primary | ICD-10-CM

## 2021-12-17 PROCEDURE — 95115 IMMUNOTHERAPY ONE INJECTION: CPT | Performed by: NURSE PRACTITIONER

## 2021-12-30 ENCOUNTER — OFFICE VISIT (OUTPATIENT)
Dept: ENT CLINIC | Age: 66
End: 2021-12-30
Payer: MEDICARE

## 2021-12-30 DIAGNOSIS — J30.9 ALLERGIC RHINITIS, UNSPECIFIED SEASONALITY, UNSPECIFIED TRIGGER: Primary | ICD-10-CM

## 2021-12-30 PROCEDURE — 95115 IMMUNOTHERAPY ONE INJECTION: CPT | Performed by: NURSE PRACTITIONER

## 2022-01-05 RX ORDER — LEVOCETIRIZINE DIHYDROCHLORIDE 5 MG/1
TABLET, FILM COATED ORAL
Qty: 90 TABLET | Refills: 1 | Status: SHIPPED | OUTPATIENT
Start: 2022-01-05 | End: 2022-10-15 | Stop reason: SDUPTHER

## 2022-01-28 ENCOUNTER — OFFICE VISIT (OUTPATIENT)
Dept: ENT CLINIC | Age: 67
End: 2022-01-28
Payer: MEDICARE

## 2022-01-28 VITALS — HEART RATE: 83 BPM | OXYGEN SATURATION: 100 % | SYSTOLIC BLOOD PRESSURE: 140 MMHG | DIASTOLIC BLOOD PRESSURE: 74 MMHG

## 2022-01-28 DIAGNOSIS — J30.89 ALLERGIC RHINITIS DUE TO OTHER ALLERGIC TRIGGER, UNSPECIFIED SEASONALITY: Primary | ICD-10-CM

## 2022-01-28 PROCEDURE — 95115 IMMUNOTHERAPY ONE INJECTION: CPT | Performed by: NURSE PRACTITIONER

## 2022-02-11 ENCOUNTER — OFFICE VISIT (OUTPATIENT)
Dept: ENT CLINIC | Age: 67
End: 2022-02-11
Payer: MEDICARE

## 2022-02-11 VITALS — OXYGEN SATURATION: 98 % | SYSTOLIC BLOOD PRESSURE: 146 MMHG | DIASTOLIC BLOOD PRESSURE: 70 MMHG | HEART RATE: 79 BPM

## 2022-02-11 DIAGNOSIS — J30.89 ALLERGIC RHINITIS DUE TO OTHER ALLERGIC TRIGGER, UNSPECIFIED SEASONALITY: Primary | ICD-10-CM

## 2022-02-11 PROCEDURE — 95115 IMMUNOTHERAPY ONE INJECTION: CPT | Performed by: NURSE PRACTITIONER

## 2022-02-11 NOTE — PROGRESS NOTES
Administered vial test 0.02ml intyradermal of vial 1 in [place].  Local reaction 9mm wheal. Gaby Mahoney

## 2022-02-17 NOTE — ADDENDUM NOTE
Addended by: Kuamr Arredondo on: 2/17/2022 03:30 PM     Modules accepted: Orders Called triage nurse at [facility], wanted to let me know she spoke with mother for post discharge follow up and she mentioned that  was having increased secretions and requiring suctioning 2 - 3 times a day. He has also been on a little more oxygen than his baseline since discharge and is more irritable, she recommended that he be seen by a Provider. Scheduled to see me in clinic at 4 pm.

## 2022-02-18 ENCOUNTER — OFFICE VISIT (OUTPATIENT)
Dept: ENT CLINIC | Age: 67
End: 2022-02-18
Payer: MEDICARE

## 2022-02-18 VITALS — DIASTOLIC BLOOD PRESSURE: 72 MMHG | HEART RATE: 77 BPM | OXYGEN SATURATION: 99 % | SYSTOLIC BLOOD PRESSURE: 134 MMHG

## 2022-02-18 DIAGNOSIS — J30.89 ALLERGIC RHINITIS DUE TO OTHER ALLERGIC TRIGGER, UNSPECIFIED SEASONALITY: Primary | ICD-10-CM

## 2022-02-18 PROCEDURE — 95115 IMMUNOTHERAPY ONE INJECTION: CPT | Performed by: NURSE PRACTITIONER

## 2022-02-18 RX ORDER — AZELASTINE 1 MG/ML
SPRAY, METERED NASAL
Qty: 1 EACH | Refills: 3 | Status: SHIPPED | OUTPATIENT
Start: 2022-02-18 | End: 2022-07-09 | Stop reason: SDUPTHER

## 2022-03-04 ENCOUNTER — OFFICE VISIT (OUTPATIENT)
Dept: ENT CLINIC | Age: 67
End: 2022-03-04
Payer: MEDICARE

## 2022-03-04 DIAGNOSIS — J30.9 ALLERGIC RHINITIS, UNSPECIFIED SEASONALITY, UNSPECIFIED TRIGGER: Primary | ICD-10-CM

## 2022-03-04 PROCEDURE — 95115 IMMUNOTHERAPY ONE INJECTION: CPT | Performed by: NURSE PRACTITIONER

## 2022-03-18 ENCOUNTER — OFFICE VISIT (OUTPATIENT)
Dept: ENT CLINIC | Age: 67
End: 2022-03-18
Payer: MEDICARE

## 2022-03-18 VITALS — OXYGEN SATURATION: 97 % | DIASTOLIC BLOOD PRESSURE: 78 MMHG | HEART RATE: 77 BPM | SYSTOLIC BLOOD PRESSURE: 126 MMHG

## 2022-03-18 DIAGNOSIS — J30.9 ALLERGIC RHINITIS, UNSPECIFIED SEASONALITY, UNSPECIFIED TRIGGER: Primary | ICD-10-CM

## 2022-03-18 PROBLEM — R11.0 NAUSEA: Status: ACTIVE | Noted: 2020-07-18

## 2022-03-18 PROBLEM — H81.09 MENIERE'S DISEASE: Status: ACTIVE | Noted: 2020-07-18

## 2022-03-18 PROCEDURE — 95115 IMMUNOTHERAPY ONE INJECTION: CPT | Performed by: NURSE PRACTITIONER

## 2022-03-19 PROBLEM — G43.109 MIGRAINE WITH VERTIGO: Status: ACTIVE | Noted: 2020-07-18

## 2022-03-19 PROBLEM — H92.09 OTALGIA: Status: ACTIVE | Noted: 2020-07-18

## 2022-03-19 PROBLEM — J30.1 ALLERGIC RHINITIS DUE TO POLLEN: Status: ACTIVE | Noted: 2020-07-18

## 2022-03-19 PROBLEM — H61.20 IMPACTED CERUMEN: Status: ACTIVE | Noted: 2020-07-18

## 2022-03-19 PROBLEM — H90.3 ASYMMETRICAL SENSORINEURAL HEARING LOSS: Status: ACTIVE | Noted: 2020-07-18

## 2022-03-19 PROBLEM — J34.3 HYPERTROPHY OF NASAL TURBINATES: Status: ACTIVE | Noted: 2020-07-18

## 2022-04-01 ENCOUNTER — OFFICE VISIT (OUTPATIENT)
Dept: ENT CLINIC | Age: 67
End: 2022-04-01
Payer: MEDICARE

## 2022-04-01 VITALS — SYSTOLIC BLOOD PRESSURE: 130 MMHG | DIASTOLIC BLOOD PRESSURE: 80 MMHG | OXYGEN SATURATION: 98 % | HEART RATE: 82 BPM

## 2022-04-01 DIAGNOSIS — J30.9 ALLERGIC RHINITIS, UNSPECIFIED SEASONALITY, UNSPECIFIED TRIGGER: Primary | ICD-10-CM

## 2022-04-01 PROCEDURE — 95115 IMMUNOTHERAPY ONE INJECTION: CPT | Performed by: NURSE PRACTITIONER

## 2022-04-15 ENCOUNTER — OFFICE VISIT (OUTPATIENT)
Dept: ENT CLINIC | Age: 67
End: 2022-04-15
Payer: MEDICARE

## 2022-04-15 VITALS — SYSTOLIC BLOOD PRESSURE: 120 MMHG | OXYGEN SATURATION: 97 % | HEART RATE: 80 BPM | DIASTOLIC BLOOD PRESSURE: 76 MMHG

## 2022-04-15 DIAGNOSIS — J30.9 ALLERGIC RHINITIS, UNSPECIFIED SEASONALITY, UNSPECIFIED TRIGGER: Primary | ICD-10-CM

## 2022-04-15 PROCEDURE — 95115 IMMUNOTHERAPY ONE INJECTION: CPT | Performed by: NURSE PRACTITIONER

## 2022-04-29 ENCOUNTER — OFFICE VISIT (OUTPATIENT)
Dept: ENT CLINIC | Age: 67
End: 2022-04-29
Payer: MEDICARE

## 2022-04-29 VITALS — HEART RATE: 75 BPM | DIASTOLIC BLOOD PRESSURE: 78 MMHG | OXYGEN SATURATION: 98 % | SYSTOLIC BLOOD PRESSURE: 116 MMHG

## 2022-04-29 DIAGNOSIS — J30.9 ALLERGIC RHINITIS, UNSPECIFIED SEASONALITY, UNSPECIFIED TRIGGER: Primary | ICD-10-CM

## 2022-04-29 PROCEDURE — 95117 IMMUNOTHERAPY INJECTIONS: CPT | Performed by: OTOLARYNGOLOGY

## 2022-05-11 ENCOUNTER — OFFICE VISIT (OUTPATIENT)
Dept: ENT CLINIC | Age: 67
End: 2022-05-11
Payer: MEDICARE

## 2022-05-11 ENCOUNTER — OFFICE VISIT (OUTPATIENT)
Dept: ENT CLINIC | Age: 67
End: 2022-05-11

## 2022-05-11 VITALS
SYSTOLIC BLOOD PRESSURE: 142 MMHG | HEIGHT: 65 IN | BODY MASS INDEX: 36.82 KG/M2 | RESPIRATION RATE: 18 BRPM | HEART RATE: 83 BPM | SYSTOLIC BLOOD PRESSURE: 142 MMHG | DIASTOLIC BLOOD PRESSURE: 74 MMHG | DIASTOLIC BLOOD PRESSURE: 74 MMHG | OXYGEN SATURATION: 96 % | WEIGHT: 221 LBS | HEART RATE: 83 BPM | OXYGEN SATURATION: 96 %

## 2022-05-11 DIAGNOSIS — J30.9 ALLERGIC RHINITIS, UNSPECIFIED SEASONALITY, UNSPECIFIED TRIGGER: Primary | ICD-10-CM

## 2022-05-11 DIAGNOSIS — H61.23 BILATERAL IMPACTED CERUMEN: ICD-10-CM

## 2022-05-11 DIAGNOSIS — H81.02 MENIERE'S DISEASE OF LEFT EAR: ICD-10-CM

## 2022-05-11 PROCEDURE — 1090F PRES/ABSN URINE INCON ASSESS: CPT | Performed by: OTOLARYNGOLOGY

## 2022-05-11 PROCEDURE — G8510 SCR DEP NEG, NO PLAN REQD: HCPCS | Performed by: OTOLARYNGOLOGY

## 2022-05-11 PROCEDURE — 95115 IMMUNOTHERAPY ONE INJECTION: CPT | Performed by: OTOLARYNGOLOGY

## 2022-05-11 PROCEDURE — 3017F COLORECTAL CA SCREEN DOC REV: CPT | Performed by: OTOLARYNGOLOGY

## 2022-05-11 PROCEDURE — 99214 OFFICE O/P EST MOD 30 MIN: CPT | Performed by: OTOLARYNGOLOGY

## 2022-05-11 PROCEDURE — G8427 DOCREV CUR MEDS BY ELIG CLIN: HCPCS | Performed by: OTOLARYNGOLOGY

## 2022-05-11 PROCEDURE — G8536 NO DOC ELDER MAL SCRN: HCPCS | Performed by: OTOLARYNGOLOGY

## 2022-05-11 PROCEDURE — 69210 REMOVE IMPACTED EAR WAX UNI: CPT | Performed by: OTOLARYNGOLOGY

## 2022-05-11 PROCEDURE — G8400 PT W/DXA NO RESULTS DOC: HCPCS | Performed by: OTOLARYNGOLOGY

## 2022-05-11 PROCEDURE — G8417 CALC BMI ABV UP PARAM F/U: HCPCS | Performed by: OTOLARYNGOLOGY

## 2022-05-11 PROCEDURE — 1101F PT FALLS ASSESS-DOCD LE1/YR: CPT | Performed by: OTOLARYNGOLOGY

## 2022-05-11 NOTE — PROGRESS NOTES
Subjective:    Eliz Hidden   79 y.o.   1955     Followup visit    Original HPI  f/u Meniere disease  pt has overall been doing ok  still on daily dyazide and nightly meclizine 12.5mg  occ woozy feeling but no major flares of vertigo  does c/o some mild sinus tightness, mild PND    had another flare of laryngitis last week  better now  this tends to occur 2-3 times/year    Followup HPI   update 6/27/19 - pt had allergy testing today; here to discuss results    update 12/12/19 - 6 mos f/u pt has been doing overall well; she has been dx with breast CA having additional surgery then XRT; has been stable from allergy shots and her MD    11/20/20 - nearly 1 year f/u MD and allergies, pt is on IT, overall doing well, MD is under good control on daily dyazide and meclizine 12.5 daily    5/21/20 - 6 mos f/u - pt doing well on IT; still on buildup; on daily dyazide, and meclizine 12.5mg nightly    11/10/21 - 6 mos f/u - she is on biweekly IT for past 1-2 months - still on dyazide daily and 12.5mg meclizine at night    5/11/2022 -6-month follow-up - she has been on daily regimen of meclizine and dyazide for her MD; and xyzal/astelin for her rhinitis; remains on biweekly IT in office    Review of Systems  Review of Systems   Constitutional: Negative for chills and fever. HENT: Positive for congestion and hearing loss. Negative for ear pain, nosebleeds and tinnitus. Eyes: Negative for blurred vision and double vision. Respiratory: Negative for cough, sputum production and shortness of breath. Cardiovascular: Negative for chest pain and palpitations. Gastrointestinal: Negative for heartburn, nausea and vomiting. Musculoskeletal: Negative for joint pain and neck pain. Skin: Negative. Neurological: Positive for dizziness. Negative for speech change, weakness and headaches. Endo/Heme/Allergies: Positive for environmental allergies. Does not bruise/bleed easily.    Psychiatric/Behavioral: Negative for memory loss. The patient does not have insomnia. Past Medical History:   Diagnosis Date    Allergic rhinitis due to pollen 7/18/2020    Asymmetrical sensorineural hearing loss 7/18/2020    History of multiple allergies     Hypertrophy of nasal turbinates 7/18/2020    Impacted cerumen 7/18/2020    Meniere's disease 7/18/2020    Migraine with vertigo 7/18/2020    Nausea 7/18/2020    Otalgia 7/18/2020     Prior to Admission medications    Medication Sig Start Date End Date Taking? Authorizing Provider   azelastine (ASTELIN) 137 mcg (0.1 %) nasal spray USE 2 SPRAYS NASALLY TWICE  A DAY 2/18/22   Kindra Louie MD   levocetirizine (XYZAL) 5 mg tablet TAKE 1 TABLET BY MOUTH  DAILY 1/5/22   Kindra Louie MD   meclizine (ANTIVERT) 12.5 mg tablet Take 1 Tab by mouth three (3) times daily as needed for Dizziness. 3/18/21   Kindra Louie MD   anastrozole (ARIMIDEX) 1 mg tablet Take 1 mg by mouth daily. Provider, Historical   Syringe with Needle,Disp, Tray (Allergist Tray 1/2cc 27Gx1/2\") 1/2 mL 27 x 1/2\" tray by Does Not Apply route. Patient not taking: Reported on 11/10/2021    Provider, Historical   benzonatate (TESSALON) 100 mg capsule Take 100 mg by mouth three (3) times daily as needed for Cough. Patient not taking: Reported on 11/10/2021    Provider, Historical   EPINEPHrine 0.15 mg/0.3 mL syrg by Injection route. Provider, Historical   hydroCHLOROthiazide (MICROZIDE) 12.5 mg capsule Take 12.5 mg by mouth daily. Patient not taking: Reported on 11/10/2021    Provider, Historical   omeprazole (PRILOSEC) 20 mg capsule Take 20 mg by mouth daily. Provider, Historical   simvastatin (ZOCOR) 40 mg tablet Take  by mouth nightly. Provider, Historical   triamterene-hydroCHLOROthiazide (MAXZIDE) 37.5-25 mg per tablet Take  by mouth daily. Provider, Historical   rivaroxaban (Xarelto) 10 mg tablet Take  by mouth daily.   Patient not taking: Reported on 11/10/2021    Provider, Historical Objective:     Visit Vitals  BP (!) 142/74 (BP 1 Location: Left upper arm, BP Patient Position: Sitting, BP Cuff Size: Adult)   Pulse 83   Resp 18   Ht 5' 4.5\" (1.638 m)   Wt 221 lb (100.2 kg)   SpO2 96%   BMI 37.35 kg/m²        Physical Exam  Vitals reviewed. Constitutional:       General: She is awake. She is not in acute distress. Appearance: Normal appearance. She is well-groomed. She is obese. HENT:      Head: Normocephalic and atraumatic. Jaw: There is normal jaw occlusion. No trismus, tenderness or malocclusion. Salivary Glands: Right salivary gland is not diffusely enlarged or tender. Left salivary gland is not diffusely enlarged or tender. Right Ear: Tympanic membrane, ear canal and external ear normal. Decreased hearing noted. There is impacted cerumen. Left Ear: Tympanic membrane, ear canal and external ear normal. Decreased hearing noted. There is impacted cerumen. Ears:      Waters exam findings: does not lateralize. Right Rinne: AC > BC. Left Rinne: AC > BC. Nose: Mucosal edema present. No nasal deformity or septal deviation. Right Nostril: No epistaxis. Left Nostril: No epistaxis. Right Turbinates: Not enlarged, swollen or pale. Left Turbinates: Not enlarged, swollen or pale. Right Sinus: No maxillary sinus tenderness or frontal sinus tenderness. Left Sinus: No maxillary sinus tenderness or frontal sinus tenderness. Mouth/Throat:      Lips: Pink. No lesions. Mouth: Mucous membranes are moist. No oral lesions. Dentition: Normal dentition. No dental caries. Tongue: No lesions. Palate: No mass and lesions. Pharynx: Oropharynx is clear. Uvula midline. No oropharyngeal exudate or posterior oropharyngeal erythema. Tonsils: No tonsillar exudate. 0 on the right. 0 on the left. Eyes:      General: Vision grossly intact. Extraocular Movements: Extraocular movements intact.       Right eye: No nystagmus. Left eye: No nystagmus. Conjunctiva/sclera: Conjunctivae normal.      Pupils: Pupils are equal, round, and reactive to light. Neck:      Thyroid: No thyroid mass, thyromegaly or thyroid tenderness. Trachea: Trachea and phonation normal. No tracheal tenderness or tracheal deviation. Cardiovascular:      Rate and Rhythm: Normal rate and regular rhythm. Pulmonary:      Effort: Pulmonary effort is normal. No respiratory distress. Breath sounds: No stridor. Musculoskeletal:         General: No swelling or tenderness. Normal range of motion. Cervical back: No edema or erythema. Lymphadenopathy:      Cervical: No cervical adenopathy. Skin:     General: Skin is warm and dry. Findings: No lesion or rash. Neurological:      General: No focal deficit present. Mental Status: She is alert and oriented to person, place, and time. Mental status is at baseline. Cranial Nerves: Cranial nerves are intact. Coordination: Romberg sign negative. Gait: Gait is intact. Psychiatric:         Mood and Affect: Mood normal.         Behavior: Behavior normal. Behavior is cooperative. Procedure - Removal Impacted Cerumen    Indications: cerumen impaction    Ears are examined under microscope/headlight.  bilateral ears are cleaned using otologic curette, suction, and/or alligator forceps. Assessment/Plan:     Encounter Diagnoses   Name Primary?  Allergic rhinitis, unspecified seasonality, unspecified trigger Yes    Bilateral impacted cerumen     Meniere's disease of left ear      MD is stable, cont current tx  Cont IT and meds; we will extend to every 3 weekly now   ears cleaned AU  Fu 6 mos    Orders Placed This Encounter    REMOVE IMPACTED EAR WAX     Follow-up and Dispositions    · Return in about 6 months (around 11/11/2022).

## 2022-05-11 NOTE — LETTER
5/11/2022    Patient: Rani Tobar   YOB: 1955   Date of Visit: 5/11/2022     Zack Gaines MD  [facility] [phone]  Via Fax: [phone]    Dear Zack Gaines MD,      Thank you for referring Ms. Viridiana Boone to [facility] [address] for evaluation. My notes for this consultation are attached. If you have questions, please do not hesitate to call me. I look forward to following your patient along with you.       Sincerely,    Maya Lundberg MD

## 2022-06-03 ENCOUNTER — OFFICE VISIT (OUTPATIENT)
Dept: ENT CLINIC | Age: 67
End: 2022-06-03
Payer: MEDICARE

## 2022-06-03 VITALS — DIASTOLIC BLOOD PRESSURE: 84 MMHG | HEART RATE: 74 BPM | SYSTOLIC BLOOD PRESSURE: 130 MMHG | OXYGEN SATURATION: 99 %

## 2022-06-03 DIAGNOSIS — J30.9 ALLERGIC RHINITIS, UNSPECIFIED SEASONALITY, UNSPECIFIED TRIGGER: Primary | ICD-10-CM

## 2022-06-03 PROCEDURE — 95115 IMMUNOTHERAPY ONE INJECTION: CPT | Performed by: OTOLARYNGOLOGY

## 2022-06-23 ENCOUNTER — OFFICE VISIT (OUTPATIENT)
Dept: ENT CLINIC | Age: 67
End: 2022-06-23
Payer: MEDICARE

## 2022-06-23 VITALS — DIASTOLIC BLOOD PRESSURE: 76 MMHG | SYSTOLIC BLOOD PRESSURE: 116 MMHG | OXYGEN SATURATION: 97 % | HEART RATE: 76 BPM

## 2022-06-23 DIAGNOSIS — J30.9 ALLERGIC RHINITIS, UNSPECIFIED SEASONALITY, UNSPECIFIED TRIGGER: Primary | ICD-10-CM

## 2022-06-23 PROCEDURE — 95115 IMMUNOTHERAPY ONE INJECTION: CPT | Performed by: OTOLARYNGOLOGY

## 2022-07-07 DIAGNOSIS — J30.89 ALLERGIC RHINITIS DUE TO OTHER ALLERGIC TRIGGER, UNSPECIFIED SEASONALITY: ICD-10-CM

## 2022-07-09 RX ORDER — AZELASTINE 1 MG/ML
SPRAY, METERED NASAL
Qty: 30 EACH | Refills: 3 | Status: SHIPPED | OUTPATIENT
Start: 2022-07-09

## 2022-07-11 DIAGNOSIS — H81.02 MENIERE'S DISEASE OF LEFT EAR: ICD-10-CM

## 2022-07-11 RX ORDER — MECLIZINE HCL 12.5 MG 12.5 MG/1
12.5 TABLET ORAL
Qty: 180 TABLET | Refills: 1 | Status: SHIPPED | OUTPATIENT
Start: 2022-07-11

## 2022-07-15 ENCOUNTER — OFFICE VISIT (OUTPATIENT)
Dept: ENT CLINIC | Age: 67
End: 2022-07-15
Payer: MEDICARE

## 2022-07-15 DIAGNOSIS — J30.89 ALLERGIC RHINITIS DUE TO OTHER ALLERGIC TRIGGER, UNSPECIFIED SEASONALITY: Primary | ICD-10-CM

## 2022-07-15 PROCEDURE — 95115 IMMUNOTHERAPY ONE INJECTION: CPT | Performed by: OTOLARYNGOLOGY

## 2022-08-05 ENCOUNTER — OFFICE VISIT (OUTPATIENT)
Dept: ENT CLINIC | Age: 67
End: 2022-08-05
Payer: MEDICARE

## 2022-08-05 VITALS — HEART RATE: 68 BPM | DIASTOLIC BLOOD PRESSURE: 76 MMHG | OXYGEN SATURATION: 98 % | SYSTOLIC BLOOD PRESSURE: 144 MMHG

## 2022-08-05 DIAGNOSIS — J30.89 ALLERGIC RHINITIS DUE TO OTHER ALLERGIC TRIGGER, UNSPECIFIED SEASONALITY: Primary | ICD-10-CM

## 2022-08-05 PROCEDURE — 95115 IMMUNOTHERAPY ONE INJECTION: CPT | Performed by: OTOLARYNGOLOGY

## 2022-08-05 NOTE — PROGRESS NOTES
Administered vial test 0.02ml intradermal of vial 1 in [place].  Local reaction 9mm wheal. Cezar Huger

## 2022-08-10 ENCOUNTER — OFFICE VISIT (OUTPATIENT)
Dept: ENT CLINIC | Age: 67
End: 2022-08-10
Payer: MEDICARE

## 2022-08-10 VITALS — DIASTOLIC BLOOD PRESSURE: 78 MMHG | SYSTOLIC BLOOD PRESSURE: 140 MMHG | HEART RATE: 83 BPM | OXYGEN SATURATION: 97 %

## 2022-08-10 DIAGNOSIS — J30.89 ALLERGIC RHINITIS DUE TO OTHER ALLERGIC TRIGGER, UNSPECIFIED SEASONALITY: Primary | ICD-10-CM

## 2022-08-10 PROCEDURE — 95115 IMMUNOTHERAPY ONE INJECTION: CPT | Performed by: OTOLARYNGOLOGY

## 2022-09-02 ENCOUNTER — OFFICE VISIT (OUTPATIENT)
Dept: ENT CLINIC | Age: 67
End: 2022-09-02
Payer: MEDICARE

## 2022-09-02 VITALS — HEART RATE: 80 BPM | OXYGEN SATURATION: 97 % | SYSTOLIC BLOOD PRESSURE: 148 MMHG | DIASTOLIC BLOOD PRESSURE: 80 MMHG

## 2022-09-02 DIAGNOSIS — J30.89 ALLERGIC RHINITIS DUE TO OTHER ALLERGIC TRIGGER, UNSPECIFIED SEASONALITY: Primary | ICD-10-CM

## 2022-09-02 PROCEDURE — 95115 IMMUNOTHERAPY ONE INJECTION: CPT | Performed by: OTOLARYNGOLOGY

## 2022-09-22 ENCOUNTER — OFFICE VISIT (OUTPATIENT)
Dept: ENT CLINIC | Age: 67
End: 2022-09-22
Payer: MEDICARE

## 2022-09-22 VITALS — OXYGEN SATURATION: 100 % | SYSTOLIC BLOOD PRESSURE: 130 MMHG | DIASTOLIC BLOOD PRESSURE: 82 MMHG | HEART RATE: 72 BPM

## 2022-09-22 DIAGNOSIS — J30.89 ALLERGIC RHINITIS DUE TO OTHER ALLERGIC TRIGGER, UNSPECIFIED SEASONALITY: Primary | ICD-10-CM

## 2022-09-22 PROCEDURE — 95115 IMMUNOTHERAPY ONE INJECTION: CPT | Performed by: OTOLARYNGOLOGY

## 2022-09-23 ENCOUNTER — HOSPITAL ENCOUNTER (OUTPATIENT)
Dept: RADIATION THERAPY | Age: 67
Discharge: HOME | End: 2022-09-23

## 2022-10-13 ENCOUNTER — OFFICE VISIT (OUTPATIENT)
Dept: ENT CLINIC | Age: 67
End: 2022-10-13
Payer: MEDICARE

## 2022-10-13 VITALS — DIASTOLIC BLOOD PRESSURE: 64 MMHG | SYSTOLIC BLOOD PRESSURE: 136 MMHG | HEART RATE: 82 BPM | OXYGEN SATURATION: 97 %

## 2022-10-13 DIAGNOSIS — J30.89 ALLERGIC RHINITIS DUE TO OTHER ALLERGIC TRIGGER, UNSPECIFIED SEASONALITY: Primary | ICD-10-CM

## 2022-10-13 PROCEDURE — 95115 IMMUNOTHERAPY ONE INJECTION: CPT | Performed by: OTOLARYNGOLOGY

## 2022-10-17 RX ORDER — LEVOCETIRIZINE DIHYDROCHLORIDE 5 MG/1
5 TABLET, FILM COATED ORAL DAILY
Qty: 90 TABLET | Refills: 1 | Status: SHIPPED | OUTPATIENT
Start: 2022-10-17

## 2022-11-11 ENCOUNTER — OFFICE VISIT (OUTPATIENT)
Dept: ENT CLINIC | Age: 67
End: 2022-11-11
Payer: MEDICARE

## 2022-11-11 ENCOUNTER — OFFICE VISIT (OUTPATIENT)
Dept: ENT CLINIC | Age: 67
End: 2022-11-11

## 2022-11-11 VITALS
WEIGHT: 221 LBS | RESPIRATION RATE: 18 BRPM | DIASTOLIC BLOOD PRESSURE: 90 MMHG | OXYGEN SATURATION: 96 % | SYSTOLIC BLOOD PRESSURE: 142 MMHG | HEIGHT: 65 IN | BODY MASS INDEX: 36.82 KG/M2 | HEART RATE: 76 BPM

## 2022-11-11 VITALS — HEART RATE: 76 BPM | OXYGEN SATURATION: 96 % | DIASTOLIC BLOOD PRESSURE: 90 MMHG | SYSTOLIC BLOOD PRESSURE: 142 MMHG

## 2022-11-11 DIAGNOSIS — H61.22 IMPACTED CERUMEN OF LEFT EAR: ICD-10-CM

## 2022-11-11 DIAGNOSIS — J30.89 ALLERGIC RHINITIS DUE TO OTHER ALLERGIC TRIGGER, UNSPECIFIED SEASONALITY: Primary | ICD-10-CM

## 2022-11-11 DIAGNOSIS — H81.02 MENIERE'S DISEASE OF LEFT EAR: ICD-10-CM

## 2022-11-11 PROCEDURE — G8417 CALC BMI ABV UP PARAM F/U: HCPCS | Performed by: OTOLARYNGOLOGY

## 2022-11-11 PROCEDURE — 1101F PT FALLS ASSESS-DOCD LE1/YR: CPT | Performed by: OTOLARYNGOLOGY

## 2022-11-11 PROCEDURE — 1123F ACP DISCUSS/DSCN MKR DOCD: CPT | Performed by: OTOLARYNGOLOGY

## 2022-11-11 PROCEDURE — 3017F COLORECTAL CA SCREEN DOC REV: CPT | Performed by: OTOLARYNGOLOGY

## 2022-11-11 PROCEDURE — 95115 IMMUNOTHERAPY ONE INJECTION: CPT | Performed by: OTOLARYNGOLOGY

## 2022-11-11 PROCEDURE — G8510 SCR DEP NEG, NO PLAN REQD: HCPCS | Performed by: OTOLARYNGOLOGY

## 2022-11-11 PROCEDURE — G8427 DOCREV CUR MEDS BY ELIG CLIN: HCPCS | Performed by: OTOLARYNGOLOGY

## 2022-11-11 PROCEDURE — 1090F PRES/ABSN URINE INCON ASSESS: CPT | Performed by: OTOLARYNGOLOGY

## 2022-11-11 PROCEDURE — 69210 REMOVE IMPACTED EAR WAX UNI: CPT | Performed by: OTOLARYNGOLOGY

## 2022-11-11 PROCEDURE — G8400 PT W/DXA NO RESULTS DOC: HCPCS | Performed by: OTOLARYNGOLOGY

## 2022-11-11 PROCEDURE — G8536 NO DOC ELDER MAL SCRN: HCPCS | Performed by: OTOLARYNGOLOGY

## 2022-11-11 PROCEDURE — 99214 OFFICE O/P EST MOD 30 MIN: CPT | Performed by: OTOLARYNGOLOGY

## 2022-11-11 NOTE — PROGRESS NOTES
Subjective:    Jozef Palumbo   79 y.o.   1955     Followup visit    Original HPI  f/u Meniere disease  pt has overall been doing ok  still on daily dyazide and nightly meclizine 12.5mg  occ woozy feeling but no major flares of vertigo  does c/o some mild sinus tightness, mild PND    had another flare of laryngitis last week  better now  this tends to occur 2-3 times/year    Followup HPI   update 6/27/19 - pt had allergy testing today; here to discuss results    update 12/12/19 - 6 mos f/u pt has been doing overall well; she has been dx with breast CA having additional surgery then XRT; has been stable from allergy shots and her MD    11/20/20 - nearly 1 year f/u MD and allergies, pt is on IT, overall doing well, MD is under good control on daily dyazide and meclizine 12.5 daily    5/21/20 - 6 mos f/u - pt doing well on IT; still on buildup; on daily dyazide, and meclizine 12.5mg nightly    11/10/21 - 6 mos f/u - she is on biweekly IT for past 1-2 months - still on dyazide daily and 12.5mg meclizine at night    5/11/2022 -6-month follow-up - she has been on daily regimen of meclizine and dyazide for her MD; and xyzal/astelin for her rhinitis; remains on biweekly IT in office    11/11/2022  6-month follow-up -overall she has done well. She is on immunotherapy every 3 weeks now with no issues. No Ménière's disease flares since her last visit. Review of Systems  Review of Systems   Constitutional:  Negative for chills and fever. HENT:  Positive for congestion and hearing loss. Negative for ear pain, nosebleeds and tinnitus. Eyes:  Negative for blurred vision and double vision. Respiratory:  Negative for cough, sputum production and shortness of breath. Cardiovascular:  Negative for chest pain and palpitations. Gastrointestinal:  Negative for heartburn, nausea and vomiting. Musculoskeletal:  Negative for joint pain and neck pain. Skin: Negative. Neurological:  Positive for dizziness. Negative for speech change, weakness and headaches. Endo/Heme/Allergies:  Positive for environmental allergies. Does not bruise/bleed easily. Psychiatric/Behavioral:  Negative for memory loss. The patient does not have insomnia. Past Medical History:   Diagnosis Date    Allergic rhinitis due to pollen 7/18/2020    Asymmetrical sensorineural hearing loss 7/18/2020    History of multiple allergies     Hypertrophy of nasal turbinates 7/18/2020    Impacted cerumen 7/18/2020    Meniere's disease 7/18/2020    Migraine with vertigo 7/18/2020    Nausea 7/18/2020    Otalgia 7/18/2020     Prior to Admission medications    Medication Sig Start Date End Date Taking? Authorizing Provider   levocetirizine (XYZAL) 5 mg tablet Take 1 Tablet by mouth daily. 10/17/22   Bisi Win MD   meclizine (ANTIVERT) 12.5 mg tablet Take 1 Tablet by mouth three (3) times daily as needed for Dizziness. 7/11/22   Bisi Win MD   azelastine (ASTELIN) 137 mcg (0.1 %) nasal spray USE 2 SPRAYS NASALLY TWICE  DAILY 7/9/22   Bisi Win MD   anastrozole (ARIMIDEX) 1 mg tablet Take 1 mg by mouth daily. Provider, Historical   Syringe with Needle,Disp, Tray (Allergist Tray 1/2cc 27Gx1/2\") 1/2 mL 27 x 1/2\" tray by Does Not Apply route. Patient not taking: Reported on 11/10/2021    Provider, Historical   benzonatate (TESSALON) 100 mg capsule Take 100 mg by mouth three (3) times daily as needed for Cough. Patient not taking: Reported on 11/10/2021    Provider, Historical   EPINEPHrine 0.15 mg/0.3 mL syrg by Injection route. Provider, Historical   hydroCHLOROthiazide (MICROZIDE) 12.5 mg capsule Take 12.5 mg by mouth daily. Patient not taking: Reported on 11/10/2021    Provider, Historical   omeprazole (PRILOSEC) 20 mg capsule Take 20 mg by mouth daily. Provider, Historical   simvastatin (ZOCOR) 40 mg tablet Take  by mouth nightly.     Provider, Historical   triamterene-hydroCHLOROthiazide (MAXZIDE) 37.5-25 mg per tablet Take  by mouth daily. Provider, Historical   rivaroxaban (Xarelto) 10 mg tablet Take  by mouth daily. Patient not taking: Reported on 11/10/2021    Provider, Historical            Objective:     Visit Vitals  BP (!) 142/90 (BP 1 Location: Left upper arm, BP Patient Position: Sitting, BP Cuff Size: Adult)   Pulse 76   Resp 18   Ht 5' 4.5\" (1.638 m)   Wt 221 lb (100.2 kg)   SpO2 96%   BMI 37.35 kg/m²        Physical Exam  Vitals reviewed. Constitutional:       General: She is awake. She is not in acute distress. Appearance: Normal appearance. She is well-groomed. She is obese. HENT:      Head: Normocephalic and atraumatic. Jaw: There is normal jaw occlusion. No trismus, tenderness or malocclusion. Salivary Glands: Right salivary gland is not diffusely enlarged or tender. Left salivary gland is not diffusely enlarged or tender. Right Ear: Tympanic membrane, ear canal and external ear normal. Decreased hearing noted. There is no impacted cerumen. Left Ear: Tympanic membrane, ear canal and external ear normal. Decreased hearing noted. There is impacted cerumen. Nose: Mucosal edema present. No nasal deformity or septal deviation. Right Nostril: No epistaxis. Left Nostril: No epistaxis. Right Turbinates: Not enlarged, swollen or pale. Left Turbinates: Not enlarged, swollen or pale. Right Sinus: No maxillary sinus tenderness or frontal sinus tenderness. Left Sinus: No maxillary sinus tenderness or frontal sinus tenderness. Mouth/Throat:      Lips: Pink. No lesions. Mouth: Mucous membranes are moist. No oral lesions. Dentition: Normal dentition. No dental caries. Tongue: No lesions. Palate: No mass and lesions. Pharynx: Oropharynx is clear. Uvula midline. No oropharyngeal exudate or posterior oropharyngeal erythema. Tonsils: No tonsillar exudate. 0 on the right. 0 on the left. Eyes:      General: Vision grossly intact. Extraocular Movements: Extraocular movements intact. Right eye: No nystagmus. Left eye: No nystagmus. Conjunctiva/sclera: Conjunctivae normal.      Pupils: Pupils are equal, round, and reactive to light. Neck:      Thyroid: No thyroid mass, thyromegaly or thyroid tenderness. Trachea: Trachea and phonation normal. No tracheal tenderness or tracheal deviation. Cardiovascular:      Rate and Rhythm: Normal rate and regular rhythm. Pulmonary:      Effort: Pulmonary effort is normal. No respiratory distress. Breath sounds: No stridor. Musculoskeletal:         General: No swelling or tenderness. Normal range of motion. Cervical back: No edema or erythema. Lymphadenopathy:      Cervical: No cervical adenopathy. Skin:     General: Skin is warm and dry. Findings: No lesion or rash. Neurological:      General: No focal deficit present. Mental Status: She is alert and oriented to person, place, and time. Mental status is at baseline. Coordination: Romberg sign negative. Gait: Gait is intact. Psychiatric:         Mood and Affect: Mood normal.         Behavior: Behavior normal. Behavior is cooperative. Procedure - Removal Impacted Cerumen    Indications: cerumen impaction    Ears are examined under microscope/headlight. Left ears are cleaned using otologic curette, suction, and/or alligator forceps. Assessment/Plan:     Encounter Diagnoses   Name Primary? Allergic rhinitis due to other allergic trigger, unspecified seasonality Yes    Meniere's disease of left ear     Impacted cerumen of left ear      MD is stable, cont current tx. Meclizine once daily, Dyazide daily. She does not need refills at this point. Allergic rhinitis  - cont IT and medications. We can expand to every 4 weekly now.   After next visit we could discuss her length of treatment and whether she wants to stop immunotherapy.    ears cleaned left side    Fu 6 mos    Orders Placed This Encounter    REMOVE IMPACTED  [address]       Follow-up and Dispositions    Return in about 6 months (around 5/11/2023).

## 2022-12-16 ENCOUNTER — OFFICE VISIT (OUTPATIENT)
Dept: ENT CLINIC | Age: 67
End: 2022-12-16
Payer: MEDICARE

## 2022-12-16 VITALS — DIASTOLIC BLOOD PRESSURE: 80 MMHG | SYSTOLIC BLOOD PRESSURE: 132 MMHG | OXYGEN SATURATION: 97 % | HEART RATE: 83 BPM

## 2022-12-16 DIAGNOSIS — J30.89 ALLERGIC RHINITIS DUE TO OTHER ALLERGIC TRIGGER, UNSPECIFIED SEASONALITY: Primary | ICD-10-CM

## 2023-01-05 DIAGNOSIS — J30.89 ALLERGIC RHINITIS DUE TO OTHER ALLERGIC TRIGGER, UNSPECIFIED SEASONALITY: ICD-10-CM

## 2023-01-06 RX ORDER — AZELASTINE 1 MG/ML
SPRAY, METERED NASAL
Qty: 1 EACH | Refills: 2 | Status: SHIPPED | OUTPATIENT
Start: 2023-01-06

## 2023-01-20 ENCOUNTER — OFFICE VISIT (OUTPATIENT)
Dept: ENT CLINIC | Age: 68
End: 2023-01-20
Payer: MEDICARE

## 2023-01-20 VITALS — SYSTOLIC BLOOD PRESSURE: 130 MMHG | DIASTOLIC BLOOD PRESSURE: 82 MMHG | OXYGEN SATURATION: 98 % | HEART RATE: 72 BPM

## 2023-01-20 DIAGNOSIS — J30.89 ALLERGIC RHINITIS DUE TO OTHER ALLERGIC TRIGGER, UNSPECIFIED SEASONALITY: Primary | ICD-10-CM

## 2023-01-20 NOTE — PROGRESS NOTES
I have reviewed the notes, assessments, and/or procedures performed by Brneden Vences RN, I concur with her/his documentation of [address].

## 2023-02-24 ENCOUNTER — OFFICE VISIT (OUTPATIENT)
Dept: ENT CLINIC | Age: 68
End: 2023-02-24
Payer: MEDICARE

## 2023-02-24 VITALS — OXYGEN SATURATION: 97 % | HEART RATE: 77 BPM | DIASTOLIC BLOOD PRESSURE: 72 MMHG | SYSTOLIC BLOOD PRESSURE: 130 MMHG

## 2023-02-24 DIAGNOSIS — J30.89 ALLERGIC RHINITIS DUE TO OTHER ALLERGIC TRIGGER, UNSPECIFIED SEASONALITY: Primary | ICD-10-CM

## 2023-02-24 NOTE — PROGRESS NOTES
I have reviewed the notes, assessments, and/or procedures performed by Tyron Blair RN, I concur with her/his documentation of [address].

## 2023-03-08 RX ORDER — LEVOCETIRIZINE DIHYDROCHLORIDE 5 MG/1
5 TABLET, FILM COATED ORAL DAILY
Qty: 90 TABLET | Refills: 1 | Status: SHIPPED | OUTPATIENT
Start: 2023-03-08

## 2023-03-24 ENCOUNTER — OFFICE VISIT (OUTPATIENT)
Dept: ENT CLINIC | Age: 68
End: 2023-03-24

## 2023-03-24 VITALS — DIASTOLIC BLOOD PRESSURE: 76 MMHG | OXYGEN SATURATION: 98 % | SYSTOLIC BLOOD PRESSURE: 124 MMHG | HEART RATE: 79 BPM

## 2023-03-24 DIAGNOSIS — H81.02 MENIERE'S DISEASE OF LEFT EAR: ICD-10-CM

## 2023-03-24 DIAGNOSIS — J30.89 ALLERGIC RHINITIS DUE TO OTHER ALLERGIC TRIGGER, UNSPECIFIED SEASONALITY: Primary | ICD-10-CM

## 2023-03-24 RX ORDER — MECLIZINE HCL 12.5 MG 12.5 MG/1
12.5 TABLET ORAL
Qty: 180 TABLET | Refills: 1 | Status: SHIPPED | OUTPATIENT
Start: 2023-03-24

## 2023-03-24 NOTE — PROGRESS NOTES
I have reviewed the notes, assessments, and/or procedures performed by Tra Haro RN, I concur with her/his documentation of [address].

## 2023-04-28 ENCOUNTER — OFFICE VISIT (OUTPATIENT)
Dept: ENT CLINIC | Age: 68
End: 2023-04-28
Payer: MEDICARE

## 2023-04-28 VITALS — HEART RATE: 73 BPM | SYSTOLIC BLOOD PRESSURE: 124 MMHG | OXYGEN SATURATION: 97 % | DIASTOLIC BLOOD PRESSURE: 68 MMHG

## 2023-04-28 DIAGNOSIS — J30.9 ALLERGIC RHINITIS, UNSPECIFIED SEASONALITY, UNSPECIFIED TRIGGER: ICD-10-CM

## 2023-04-28 DIAGNOSIS — J30.89 ALLERGIC RHINITIS DUE TO OTHER ALLERGIC TRIGGER, UNSPECIFIED SEASONALITY: Primary | ICD-10-CM

## 2023-04-28 PROCEDURE — 95115 IMMUNOTHERAPY ONE INJECTION: CPT | Performed by: NURSE PRACTITIONER

## 2023-04-28 NOTE — PROGRESS NOTES
I have reviewed the notes, assessments, and/or procedures performed by Bertin Mcguire RN, I concur with her/his documentation of [address].

## 2023-05-26 RX ORDER — OMEPRAZOLE 20 MG/1
20 CAPSULE, DELAYED RELEASE ORAL DAILY
COMMUNITY

## 2023-05-26 RX ORDER — SIMVASTATIN 40 MG
TABLET ORAL
COMMUNITY

## 2023-05-26 RX ORDER — ANASTROZOLE 1 MG/1
1 TABLET ORAL DAILY
COMMUNITY

## 2023-05-26 RX ORDER — HYDROCHLOROTHIAZIDE 12.5 MG/1
12.5 CAPSULE, GELATIN COATED ORAL DAILY
COMMUNITY

## 2023-05-26 RX ORDER — TRIAMTERENE AND HYDROCHLOROTHIAZIDE 37.5; 25 MG/1; MG/1
TABLET ORAL DAILY
COMMUNITY

## 2023-05-26 RX ORDER — EPINEPHRINE 0.15 MG/.3ML
SOLUTION INTRAMUSCULAR; SUBCUTANEOUS
COMMUNITY

## 2023-05-26 RX ORDER — LEVOCETIRIZINE DIHYDROCHLORIDE 5 MG/1
5 TABLET, FILM COATED ORAL DAILY
COMMUNITY
Start: 2023-03-08

## 2023-05-26 RX ORDER — BENZONATATE 100 MG/1
100 CAPSULE ORAL 3 TIMES DAILY PRN
COMMUNITY

## 2023-05-26 RX ORDER — AZELASTINE 1 MG/ML
SPRAY, METERED NASAL
COMMUNITY
Start: 2023-01-06

## 2023-05-26 RX ORDER — MECLIZINE HCL 12.5 MG/1
12.5 TABLET ORAL 3 TIMES DAILY PRN
COMMUNITY
Start: 2023-03-24

## 2023-05-30 ENCOUNTER — OFFICE VISIT (OUTPATIENT)
Age: 68
End: 2023-05-30
Payer: MEDICARE

## 2023-05-30 VITALS
HEART RATE: 73 BPM | OXYGEN SATURATION: 98 % | BODY MASS INDEX: 36.82 KG/M2 | DIASTOLIC BLOOD PRESSURE: 90 MMHG | HEIGHT: 65 IN | RESPIRATION RATE: 17 BRPM | SYSTOLIC BLOOD PRESSURE: 142 MMHG | WEIGHT: 221 LBS

## 2023-05-30 DIAGNOSIS — H61.23 IMPACTED CERUMEN, BILATERAL: ICD-10-CM

## 2023-05-30 DIAGNOSIS — J30.9 ALLERGIC RHINITIS, UNSPECIFIED SEASONALITY, UNSPECIFIED TRIGGER: Primary | ICD-10-CM

## 2023-05-30 DIAGNOSIS — H81.02 MENIERE'S DISEASE, LEFT EAR: ICD-10-CM

## 2023-05-30 PROCEDURE — 69210 REMOVE IMPACTED EAR WAX UNI: CPT | Performed by: OTOLARYNGOLOGY

## 2023-05-30 PROCEDURE — 1123F ACP DISCUSS/DSCN MKR DOCD: CPT | Performed by: OTOLARYNGOLOGY

## 2023-05-30 PROCEDURE — 99213 OFFICE O/P EST LOW 20 MIN: CPT | Performed by: OTOLARYNGOLOGY

## 2023-05-30 ASSESSMENT — ENCOUNTER SYMPTOMS
VOMITING: 0
ABDOMINAL PAIN: 0
SORE THROAT: 0
EYE ITCHING: 0
STRIDOR: 0
APNEA: 0
SINUS PAIN: 0
NAUSEA: 0
EYE DISCHARGE: 0
VOICE CHANGE: 0
CHOKING: 0
BACK PAIN: 0
COUGH: 0
TROUBLE SWALLOWING: 0
SINUS PRESSURE: 0
SHORTNESS OF BREATH: 0
WHEEZING: 0
PHOTOPHOBIA: 0

## 2023-05-30 NOTE — PROGRESS NOTES
Subjective:    Ramona Mishra   76 y.o.   1955     Followup Visit    History of Present Illness:    Original HPI  f/u Meniere disease  pt has overall been doing ok  still on daily dyazide and nightly meclizine 12.5mg  occ woozy feeling but no major flares of vertigo  does c/o some mild sinus tightness, mild PND     had another flare of laryngitis last week  better now  this tends to occur 2-3 times/year     Followup HPI   update 6/27/19 - pt had allergy testing today; here to discuss results     update 12/12/19 - 6 mos f/u pt has been doing overall well; she has been dx with breast CA having additional surgery then XRT; has been stable from allergy shots and her MD     11/20/20 - nearly 1 year f/u MD and allergies, pt is on IT, overall doing well, MD is under good control on daily dyazide and meclizine 12.5 daily     5/21/20 - 6 mos f/u - pt doing well on IT; still on buildup; on daily dyazide, and meclizine 12.5mg nightly     11/10/21 - 6 mos f/u - she is on biweekly IT for past 1-2 months - still on dyazide daily and 12.5mg meclizine at night     5/11/2022 -6-month follow-up - she has been on daily regimen of meclizine and dyazide for her MD; and xyzal/astelin for her rhinitis; remains on biweekly IT in office     11/11/2022  6-month follow-up -overall she has done well. She is on immunotherapy every 3 weeks now with no issues. No Ménière's disease flares since her last visit. 5/30/2023  6-month follow-up - she is now on IT every 4 weeks - continues with xyzal/astelin daily - routine is same, no issues  No major dizziness episodes, remains on daily dyazide and meclizine    Review of Systems  Review of Systems   Constitutional:  Negative for appetite change, chills, fatigue and fever. HENT:  Positive for tinnitus. Negative for congestion, ear discharge, ear pain, nosebleeds, postnasal drip, sinus pressure, sinus pain, sneezing, sore throat, trouble swallowing and voice change.     Eyes:  Negative for

## 2023-08-08 RX ORDER — LEVOCETIRIZINE DIHYDROCHLORIDE 5 MG/1
5 TABLET, FILM COATED ORAL DAILY
Qty: 90 TABLET | OUTPATIENT
Start: 2023-08-08

## 2023-08-08 RX ORDER — AZELASTINE 1 MG/ML
SPRAY, METERED NASAL
Qty: 30 ML | Refills: 3 | Status: SHIPPED | OUTPATIENT
Start: 2023-08-08

## 2023-08-08 RX ORDER — AZELASTINE HYDROCHLORIDE 137 UG/1
SPRAY, METERED NASAL
Qty: 90 ML | OUTPATIENT
Start: 2023-08-08

## 2023-08-08 RX ORDER — LEVOCETIRIZINE DIHYDROCHLORIDE 5 MG/1
5 TABLET, FILM COATED ORAL DAILY
Qty: 90 TABLET | Refills: 1 | Status: SHIPPED | OUTPATIENT
Start: 2023-08-08

## 2023-09-28 ENCOUNTER — OFFICE VISIT (OUTPATIENT)
Age: 68
End: 2023-09-28
Payer: MEDICARE

## 2023-09-28 VITALS
HEIGHT: 65 IN | WEIGHT: 221 LBS | DIASTOLIC BLOOD PRESSURE: 78 MMHG | HEART RATE: 69 BPM | SYSTOLIC BLOOD PRESSURE: 165 MMHG | RESPIRATION RATE: 18 BRPM | BODY MASS INDEX: 36.82 KG/M2

## 2023-09-28 DIAGNOSIS — M79.672 LEFT FOOT PAIN: Primary | ICD-10-CM

## 2023-09-28 DIAGNOSIS — M20.42 HAMMERTOE OF SECOND TOE OF LEFT FOOT: ICD-10-CM

## 2023-09-28 DIAGNOSIS — M20.12 HAV (HALLUX ABDUCTO VALGUS), LEFT: ICD-10-CM

## 2023-09-28 PROCEDURE — 73630 X-RAY EXAM OF FOOT: CPT | Performed by: PODIATRIST

## 2023-09-28 PROCEDURE — 99204 OFFICE O/P NEW MOD 45 MIN: CPT | Performed by: PODIATRIST

## 2023-09-28 PROCEDURE — 1123F ACP DISCUSS/DSCN MKR DOCD: CPT | Performed by: PODIATRIST

## 2023-10-02 ENCOUNTER — TELEPHONE (OUTPATIENT)
Age: 68
End: 2023-10-02

## 2023-10-02 NOTE — TELEPHONE ENCOUNTER
Patient is calling to let you know she has decided to have the foot surgery that was discussed at her last appointment. Unsure who I need to route this to.  Please advise

## 2023-10-04 NOTE — TELEPHONE ENCOUNTER
Sent pt message asking if December 8, 2023 will be ok for her surgery. She had ask about the first week in December. Once I get a preferred date from the pt I will confirm with Dr. Cyndee Tinsley.  Thank you

## 2023-10-09 ENCOUNTER — TELEPHONE (OUTPATIENT)
Age: 68
End: 2023-10-09

## 2023-11-15 ENCOUNTER — OFFICE VISIT (OUTPATIENT)
Age: 68
End: 2023-11-15
Payer: MEDICARE

## 2023-11-15 VITALS
HEIGHT: 65 IN | TEMPERATURE: 97.8 F | WEIGHT: 221 LBS | SYSTOLIC BLOOD PRESSURE: 142 MMHG | DIASTOLIC BLOOD PRESSURE: 88 MMHG | HEART RATE: 68 BPM | BODY MASS INDEX: 36.82 KG/M2

## 2023-11-15 DIAGNOSIS — M79.672 LEFT FOOT PAIN: ICD-10-CM

## 2023-11-15 DIAGNOSIS — M20.12 HAV (HALLUX ABDUCTO VALGUS), LEFT: Primary | ICD-10-CM

## 2023-11-15 PROCEDURE — 1123F ACP DISCUSS/DSCN MKR DOCD: CPT | Performed by: PODIATRIST

## 2023-11-15 PROCEDURE — 99214 OFFICE O/P EST MOD 30 MIN: CPT | Performed by: PODIATRIST

## 2023-11-15 RX ORDER — CHOLECALCIFEROL (VITAMIN D3) 125 MCG
500 CAPSULE ORAL DAILY
COMMUNITY

## 2023-11-15 RX ORDER — MULTIVIT WITH MINERALS/LUTEIN
1000 TABLET ORAL DAILY
COMMUNITY

## 2023-11-29 ENCOUNTER — OFFICE VISIT (OUTPATIENT)
Age: 68
End: 2023-11-29
Payer: MEDICARE

## 2023-11-29 VITALS
HEIGHT: 65 IN | OXYGEN SATURATION: 99 % | BODY MASS INDEX: 36.82 KG/M2 | HEART RATE: 81 BPM | SYSTOLIC BLOOD PRESSURE: 141 MMHG | DIASTOLIC BLOOD PRESSURE: 78 MMHG | WEIGHT: 221 LBS

## 2023-11-29 DIAGNOSIS — J30.9 ALLERGIC RHINITIS, UNSPECIFIED SEASONALITY, UNSPECIFIED TRIGGER: Primary | ICD-10-CM

## 2023-11-29 DIAGNOSIS — H61.22 IMPACTED CERUMEN, LEFT EAR: ICD-10-CM

## 2023-11-29 DIAGNOSIS — H81.02 MENIERE'S DISEASE, LEFT EAR: ICD-10-CM

## 2023-11-29 PROCEDURE — G8427 DOCREV CUR MEDS BY ELIG CLIN: HCPCS | Performed by: OTOLARYNGOLOGY

## 2023-11-29 PROCEDURE — G8400 PT W/DXA NO RESULTS DOC: HCPCS | Performed by: OTOLARYNGOLOGY

## 2023-11-29 PROCEDURE — 1090F PRES/ABSN URINE INCON ASSESS: CPT | Performed by: OTOLARYNGOLOGY

## 2023-11-29 PROCEDURE — 99213 OFFICE O/P EST LOW 20 MIN: CPT | Performed by: OTOLARYNGOLOGY

## 2023-11-29 PROCEDURE — 1123F ACP DISCUSS/DSCN MKR DOCD: CPT | Performed by: OTOLARYNGOLOGY

## 2023-11-29 PROCEDURE — G8417 CALC BMI ABV UP PARAM F/U: HCPCS | Performed by: OTOLARYNGOLOGY

## 2023-11-29 PROCEDURE — 3017F COLORECTAL CA SCREEN DOC REV: CPT | Performed by: OTOLARYNGOLOGY

## 2023-11-29 PROCEDURE — 69210 REMOVE IMPACTED EAR WAX UNI: CPT | Performed by: OTOLARYNGOLOGY

## 2023-11-29 PROCEDURE — G8484 FLU IMMUNIZE NO ADMIN: HCPCS | Performed by: OTOLARYNGOLOGY

## 2023-11-29 PROCEDURE — 1036F TOBACCO NON-USER: CPT | Performed by: OTOLARYNGOLOGY

## 2023-11-29 ASSESSMENT — ENCOUNTER SYMPTOMS
CHOKING: 0
EYE DISCHARGE: 0
SHORTNESS OF BREATH: 0
NAUSEA: 0
APNEA: 0
SORE THROAT: 0
WHEEZING: 0
STRIDOR: 0
VOMITING: 0
COUGH: 0
SINUS PAIN: 0
SINUS PRESSURE: 0
PHOTOPHOBIA: 0
VOICE CHANGE: 0
BACK PAIN: 0
EYE ITCHING: 0
TROUBLE SWALLOWING: 0
ABDOMINAL PAIN: 0

## 2023-11-30 ENCOUNTER — TELEPHONE (OUTPATIENT)
Age: 68
End: 2023-11-30

## 2023-11-30 NOTE — TELEPHONE ENCOUNTER
Could you please call patient and let her know that surgery has been rescheduled for 12-15-23 @ 7:30am because Dr. Katherin Zurtia will be out of town? Thank you!

## 2023-12-06 NOTE — TELEPHONE ENCOUNTER
Patient notified that surgery date/time was rescheduled to 12-15-23 @ 7:30am. Scheduled patient for post-op follow up for 01-02-23 @ 1:30pm.    Patient states she needs her FMLA updated. Will update and refax to [employer]. She would need to be seen to do this

## 2023-12-11 RX ORDER — PHENOL 1.4 %
1 AEROSOL, SPRAY (ML) MUCOUS MEMBRANE DAILY
COMMUNITY

## 2023-12-14 ENCOUNTER — TELEPHONE (OUTPATIENT)
Age: 68
End: 2023-12-14

## 2023-12-14 NOTE — TELEPHONE ENCOUNTER
Patient scheduled for surgery 12-15-23 @ 0730. Waiting on PCP clearance for patient. Left message for Dr. Mary Varma office to call back with surgical clearance. Patient was seen 11-27-23 but notation was not made that patient was cleared. Still have not received PA from [facility] for surgery. Called provider line and spoke with Christy. Told her that we sent a fax in 12/04/23 but I have not heard back from insurance carrier. [facility] states CPT 05069 does not need PA, but CPT 42190 does need prior authorization. Reference # [id number]. PA submitted and  will reach out to us. They are aware that surgery is tomorrow, but they cannot promise this will be done prior to surgery. If patient has surgery and PA not received, it will be denied and patient will need to submit medical records stating it was medically necessary. They said prior authorizations should go through portal, which I do not have access to [employer]. I spoke with patient who was upset that we do not have PA for part of her surgery. I explained the situation to patient and she is aware that if PA is not received for CPT 36894, they may deny and we will need to submit records. I asked patient if she would like to reschedule surgery and patient stated she did not want to reschedule, as she wants to get this done. Awaiting clearance from PCP.

## 2023-12-15 ENCOUNTER — ANESTHESIA (OUTPATIENT)
Facility: HOSPITAL | Age: 68
End: 2023-12-15
Payer: MEDICARE

## 2023-12-15 ENCOUNTER — APPOINTMENT (OUTPATIENT)
Facility: HOSPITAL | Age: 68
End: 2023-12-15
Attending: PODIATRIST
Payer: MEDICARE

## 2023-12-15 ENCOUNTER — ANESTHESIA EVENT (OUTPATIENT)
Facility: HOSPITAL | Age: 68
End: 2023-12-15
Payer: MEDICARE

## 2023-12-15 ENCOUNTER — HOSPITAL ENCOUNTER (OUTPATIENT)
Facility: HOSPITAL | Age: 68
Setting detail: OUTPATIENT SURGERY
Discharge: HOME | End: 2023-12-15
Attending: PODIATRIST | Admitting: INTERNAL MEDICINE
Payer: MEDICARE

## 2023-12-15 VITALS
DIASTOLIC BLOOD PRESSURE: 86 MMHG | OXYGEN SATURATION: 98 % | HEIGHT: 65 IN | SYSTOLIC BLOOD PRESSURE: 157 MMHG | WEIGHT: 222 LBS | RESPIRATION RATE: 18 BRPM | TEMPERATURE: 97.6 F | HEART RATE: 80 BPM | BODY MASS INDEX: 36.99 KG/M2

## 2023-12-15 DIAGNOSIS — M20.12 HAV (HALLUX ABDUCTO VALGUS), LEFT: Primary | ICD-10-CM

## 2023-12-15 LAB
ANION GAP BLD CALC-SCNC: 14
CA-I BLD-MCNC: 1.21 MMOL/L (ref 1.12–1.32)
CHLORIDE BLD-SCNC: 106 MMOL/L (ref 98–107)
CO2 BLD-SCNC: 25 MMOL/L
CREAT UR-MCNC: 1.22 MG/DL (ref 0.6–1.3)
GLUCOSE BLD STRIP.AUTO-MCNC: 109 MG/DL (ref 65–100)
POTASSIUM BLD-SCNC: 3.5 MMOL/L (ref 3.5–5.5)
SODIUM BLD-SCNC: 144 MMOL/L (ref 136–145)

## 2023-12-15 PROCEDURE — 80047 BASIC METABLC PNL IONIZED CA: CPT

## 2023-12-15 PROCEDURE — 3700000001 HC ADD 15 MINUTES (ANESTHESIA): Performed by: PODIATRIST

## 2023-12-15 PROCEDURE — 2500000003 HC RX 250 WO HCPCS: Performed by: NURSE ANESTHETIST, CERTIFIED REGISTERED

## 2023-12-15 PROCEDURE — 7100000001 HC PACU RECOVERY - ADDTL 15 MIN: Performed by: PODIATRIST

## 2023-12-15 PROCEDURE — 3600000004 HC SURGERY LEVEL 4 BASE: Performed by: PODIATRIST

## 2023-12-15 PROCEDURE — 76000 FLUOROSCOPY <1 HR PHYS/QHP: CPT | Performed by: PODIATRIST

## 2023-12-15 PROCEDURE — 6360000002 HC RX W HCPCS: Performed by: NURSE ANESTHETIST, CERTIFIED REGISTERED

## 2023-12-15 PROCEDURE — 2500000003 HC RX 250 WO HCPCS: Performed by: PODIATRIST

## 2023-12-15 PROCEDURE — 6360000002 HC RX W HCPCS: Performed by: PODIATRIST

## 2023-12-15 PROCEDURE — 3700000000 HC ANESTHESIA ATTENDED CARE: Performed by: PODIATRIST

## 2023-12-15 PROCEDURE — 7100000011 HC PHASE II RECOVERY - ADDTL 15 MIN: Performed by: PODIATRIST

## 2023-12-15 PROCEDURE — C1713 ANCHOR/SCREW BN/BN,TIS/BN: HCPCS | Performed by: PODIATRIST

## 2023-12-15 PROCEDURE — 76000 FLUOROSCOPY <1 HR PHYS/QHP: CPT

## 2023-12-15 PROCEDURE — 73620 X-RAY EXAM OF FOOT: CPT

## 2023-12-15 PROCEDURE — 28285 REPAIR OF HAMMERTOE: CPT | Performed by: PODIATRIST

## 2023-12-15 PROCEDURE — 3600000014 HC SURGERY LEVEL 4 ADDTL 15MIN: Performed by: PODIATRIST

## 2023-12-15 PROCEDURE — 2709999900 HC NON-CHARGEABLE SUPPLY: Performed by: PODIATRIST

## 2023-12-15 PROCEDURE — 2580000003 HC RX 258: Performed by: PODIATRIST

## 2023-12-15 PROCEDURE — 7100000010 HC PHASE II RECOVERY - FIRST 15 MIN: Performed by: PODIATRIST

## 2023-12-15 PROCEDURE — 28750 FUSION OF BIG TOE JOINT: CPT | Performed by: PODIATRIST

## 2023-12-15 PROCEDURE — 7100000000 HC PACU RECOVERY - FIRST 15 MIN: Performed by: PODIATRIST

## 2023-12-15 PROCEDURE — 2720000010 HC SURG SUPPLY STERILE: Performed by: PODIATRIST

## 2023-12-15 DEVICE — 2.7 X 10 MM R3CON LOCKING PLATE SCREW
Type: IMPLANTABLE DEVICE | Site: FOOT | Status: FUNCTIONAL
Brand: GORILLA PLATING SYSTEM

## 2023-12-15 DEVICE — 2.7 X 14 MM R3CON NON-LOCKING PLATE SCREW
Type: IMPLANTABLE DEVICE | Site: FOOT | Status: FUNCTIONAL
Brand: GORILLA PLATING SYSTEM

## 2023-12-15 DEVICE — 2.7 X 17 MM R3CON NON-LOCKING PLATE SCREW
Type: IMPLANTABLE DEVICE | Site: FOOT | Status: FUNCTIONAL
Brand: GORILLA PLATING SYSTEM

## 2023-12-15 DEVICE — MINI MONSTER HEADED, SHORT THREAD, 3.0 X 30MM
Type: IMPLANTABLE DEVICE | Site: FOOT | Status: FUNCTIONAL
Brand: MONSTER SCREW SYSTEM

## 2023-12-15 DEVICE — 2.7 X 14 MM R3CON LOCKING PLATE SCREW
Type: IMPLANTABLE DEVICE | Site: FOOT | Status: FUNCTIONAL
Brand: GORILLA PLATING SYSTEM

## 2023-12-15 RX ORDER — SODIUM CHLORIDE, SODIUM LACTATE, POTASSIUM CHLORIDE, CALCIUM CHLORIDE 600; 310; 30; 20 MG/100ML; MG/100ML; MG/100ML; MG/100ML
INJECTION, SOLUTION INTRAVENOUS CONTINUOUS
Status: DISCONTINUED | OUTPATIENT
Start: 2023-12-15 | End: 2023-12-15 | Stop reason: HOSPADM

## 2023-12-15 RX ORDER — FENTANYL CITRATE 50 UG/ML
50 INJECTION, SOLUTION INTRAMUSCULAR; INTRAVENOUS EVERY 5 MIN PRN
Status: DISCONTINUED | OUTPATIENT
Start: 2023-12-15 | End: 2023-12-15 | Stop reason: HOSPADM

## 2023-12-15 RX ORDER — LIDOCAINE HYDROCHLORIDE 10 MG/ML
INJECTION, SOLUTION INFILTRATION; PERINEURAL PRN
Status: DISCONTINUED | OUTPATIENT
Start: 2023-12-15 | End: 2023-12-15 | Stop reason: ALTCHOICE

## 2023-12-15 RX ORDER — OXYCODONE HYDROCHLORIDE AND ACETAMINOPHEN 5; 325 MG/1; MG/1
1 TABLET ORAL EVERY 6 HOURS PRN
Qty: 12 TABLET | Refills: 0 | Status: SHIPPED | OUTPATIENT
Start: 2023-12-15 | End: 2023-12-18

## 2023-12-15 RX ORDER — LIDOCAINE 4 G/G
1 PATCH TOPICAL AS NEEDED
Status: DISCONTINUED | OUTPATIENT
Start: 2023-12-15 | End: 2023-12-15 | Stop reason: HOSPADM

## 2023-12-15 RX ORDER — ASPIRIN 81 MG/1
81 TABLET, CHEWABLE ORAL DAILY
COMMUNITY

## 2023-12-15 RX ORDER — METOCLOPRAMIDE HYDROCHLORIDE 5 MG/ML
10 INJECTION INTRAMUSCULAR; INTRAVENOUS
Status: DISCONTINUED | OUTPATIENT
Start: 2023-12-15 | End: 2023-12-15 | Stop reason: HOSPADM

## 2023-12-15 RX ORDER — OXYCODONE HYDROCHLORIDE 5 MG/1
10 TABLET ORAL PRN
Status: DISCONTINUED | OUTPATIENT
Start: 2023-12-15 | End: 2023-12-15 | Stop reason: HOSPADM

## 2023-12-15 RX ORDER — ONDANSETRON 2 MG/ML
INJECTION INTRAMUSCULAR; INTRAVENOUS PRN
Status: DISCONTINUED | OUTPATIENT
Start: 2023-12-15 | End: 2023-12-15 | Stop reason: SDUPTHER

## 2023-12-15 RX ORDER — HYDROMORPHONE HYDROCHLORIDE 1 MG/ML
0.5 INJECTION, SOLUTION INTRAMUSCULAR; INTRAVENOUS; SUBCUTANEOUS EVERY 5 MIN PRN
Status: DISCONTINUED | OUTPATIENT
Start: 2023-12-15 | End: 2023-12-15 | Stop reason: HOSPADM

## 2023-12-15 RX ORDER — LABETALOL HYDROCHLORIDE 5 MG/ML
10 INJECTION, SOLUTION INTRAVENOUS
Status: DISCONTINUED | OUTPATIENT
Start: 2023-12-15 | End: 2023-12-15 | Stop reason: HOSPADM

## 2023-12-15 RX ORDER — DEXTROSE MONOHYDRATE 100 MG/ML
INJECTION, SOLUTION INTRAVENOUS CONTINUOUS PRN
Status: DISCONTINUED | OUTPATIENT
Start: 2023-12-15 | End: 2023-12-15 | Stop reason: HOSPADM

## 2023-12-15 RX ORDER — LIDOCAINE HYDROCHLORIDE 10 MG/ML
INJECTION, SOLUTION INFILTRATION; PERINEURAL PRN
Status: DISCONTINUED | OUTPATIENT
Start: 2023-12-15 | End: 2023-12-15 | Stop reason: SDUPTHER

## 2023-12-15 RX ORDER — CLINDAMYCIN PHOSPHATE 900 MG/50ML
900 INJECTION, SOLUTION INTRAVENOUS ONCE
Status: COMPLETED | OUTPATIENT
Start: 2023-12-15 | End: 2023-12-15

## 2023-12-15 RX ORDER — ACETAMINOPHEN 650 MG
TABLET, EXTENDED RELEASE ORAL PRN
Status: DISCONTINUED | OUTPATIENT
Start: 2023-12-15 | End: 2023-12-15 | Stop reason: ALTCHOICE

## 2023-12-15 RX ORDER — DIPHENHYDRAMINE HYDROCHLORIDE 50 MG/ML
12.5 INJECTION INTRAMUSCULAR; INTRAVENOUS
Status: DISCONTINUED | OUTPATIENT
Start: 2023-12-15 | End: 2023-12-15 | Stop reason: HOSPADM

## 2023-12-15 RX ORDER — OXYCODONE HYDROCHLORIDE 5 MG/1
5 TABLET ORAL PRN
Status: DISCONTINUED | OUTPATIENT
Start: 2023-12-15 | End: 2023-12-15 | Stop reason: HOSPADM

## 2023-12-15 RX ORDER — SODIUM CHLORIDE 0.9 % (FLUSH) 0.9 %
5-40 SYRINGE (ML) INJECTION PRN
Status: DISCONTINUED | OUTPATIENT
Start: 2023-12-15 | End: 2023-12-15 | Stop reason: HOSPADM

## 2023-12-15 RX ORDER — MIDAZOLAM HYDROCHLORIDE 1 MG/ML
INJECTION INTRAMUSCULAR; INTRAVENOUS PRN
Status: DISCONTINUED | OUTPATIENT
Start: 2023-12-15 | End: 2023-12-15 | Stop reason: SDUPTHER

## 2023-12-15 RX ORDER — LORAZEPAM 2 MG/ML
0.5 INJECTION INTRAMUSCULAR
Status: DISCONTINUED | OUTPATIENT
Start: 2023-12-15 | End: 2023-12-15 | Stop reason: HOSPADM

## 2023-12-15 RX ORDER — FENTANYL CITRATE 50 UG/ML
INJECTION, SOLUTION INTRAMUSCULAR; INTRAVENOUS PRN
Status: DISCONTINUED | OUTPATIENT
Start: 2023-12-15 | End: 2023-12-15 | Stop reason: SDUPTHER

## 2023-12-15 RX ORDER — HYDRALAZINE HYDROCHLORIDE 20 MG/ML
10 INJECTION INTRAMUSCULAR; INTRAVENOUS
Status: DISCONTINUED | OUTPATIENT
Start: 2023-12-15 | End: 2023-12-15 | Stop reason: HOSPADM

## 2023-12-15 RX ORDER — SODIUM CHLORIDE 9 MG/ML
INJECTION, SOLUTION INTRAVENOUS PRN
Status: DISCONTINUED | OUTPATIENT
Start: 2023-12-15 | End: 2023-12-15 | Stop reason: HOSPADM

## 2023-12-15 RX ORDER — ONDANSETRON 2 MG/ML
4 INJECTION INTRAMUSCULAR; INTRAVENOUS
Status: DISCONTINUED | OUTPATIENT
Start: 2023-12-15 | End: 2023-12-15 | Stop reason: HOSPADM

## 2023-12-15 RX ORDER — DEXAMETHASONE SODIUM PHOSPHATE 4 MG/ML
INJECTION, SOLUTION INTRA-ARTICULAR; INTRALESIONAL; INTRAMUSCULAR; INTRAVENOUS; SOFT TISSUE PRN
Status: DISCONTINUED | OUTPATIENT
Start: 2023-12-15 | End: 2023-12-15 | Stop reason: SDUPTHER

## 2023-12-15 RX ORDER — SODIUM CHLORIDE 0.9 % (FLUSH) 0.9 %
5-40 SYRINGE (ML) INJECTION EVERY 12 HOURS SCHEDULED
Status: DISCONTINUED | OUTPATIENT
Start: 2023-12-15 | End: 2023-12-15 | Stop reason: HOSPADM

## 2023-12-15 RX ORDER — SODIUM CHLORIDE, SODIUM LACTATE, POTASSIUM CHLORIDE, CALCIUM CHLORIDE 600; 310; 30; 20 MG/100ML; MG/100ML; MG/100ML; MG/100ML
INJECTION, SOLUTION INTRAVENOUS ONCE
Status: DISCONTINUED | OUTPATIENT
Start: 2023-12-15 | End: 2023-12-15 | Stop reason: HOSPADM

## 2023-12-15 RX ORDER — IPRATROPIUM BROMIDE AND ALBUTEROL SULFATE 2.5; .5 MG/3ML; MG/3ML
1 SOLUTION RESPIRATORY (INHALATION)
Status: DISCONTINUED | OUTPATIENT
Start: 2023-12-15 | End: 2023-12-15 | Stop reason: HOSPADM

## 2023-12-15 RX ADMIN — FENTANYL CITRATE 50 MCG: 50 INJECTION, SOLUTION INTRAMUSCULAR; INTRAVENOUS at 07:37

## 2023-12-15 RX ADMIN — CLINDAMYCIN PHOSPHATE 900 MG: 900 INJECTION, SOLUTION INTRAVENOUS at 07:28

## 2023-12-15 RX ADMIN — PROPOFOL 150 MG: 10 INJECTION, EMULSION INTRAVENOUS at 07:37

## 2023-12-15 RX ADMIN — MIDAZOLAM HYDROCHLORIDE 2 MG: 2 INJECTION, SOLUTION INTRAMUSCULAR; INTRAVENOUS at 07:28

## 2023-12-15 RX ADMIN — FENTANYL CITRATE 50 MCG: 50 INJECTION, SOLUTION INTRAMUSCULAR; INTRAVENOUS at 07:54

## 2023-12-15 RX ADMIN — SODIUM CHLORIDE, POTASSIUM CHLORIDE, SODIUM LACTATE AND CALCIUM CHLORIDE: 600; 310; 30; 20 INJECTION, SOLUTION INTRAVENOUS at 07:28

## 2023-12-15 RX ADMIN — LIDOCAINE HYDROCHLORIDE 80 MG: 10 INJECTION, SOLUTION INFILTRATION; PERINEURAL at 07:37

## 2023-12-15 RX ADMIN — ONDANSETRON 4 MG: 2 INJECTION INTRAMUSCULAR; INTRAVENOUS at 07:52

## 2023-12-15 RX ADMIN — DEXAMETHASONE SODIUM PHOSPHATE 4 MG: 4 INJECTION, SOLUTION INTRA-ARTICULAR; INTRALESIONAL; INTRAMUSCULAR; INTRAVENOUS; SOFT TISSUE at 07:52

## 2023-12-15 RX ADMIN — CLINDAMYCIN PHOSPHATE 900 MG: 900 INJECTION, SOLUTION INTRAVENOUS at 06:49

## 2023-12-15 ASSESSMENT — PAIN - FUNCTIONAL ASSESSMENT: PAIN_FUNCTIONAL_ASSESSMENT: 0-10

## 2023-12-15 ASSESSMENT — PAIN SCALES - GENERAL
PAINLEVEL_OUTOF10: 0
PAINLEVEL_OUTOF10: 0

## 2023-12-15 NOTE — ANESTHESIA PRE PROCEDURE
Department of Anesthesiology  Preprocedure Note       Name:  Keena West   Age:  76 y.o.  :  1955                                          MRN:  [id number]         Date:  12/15/2023      Surgeon: Camilla Coates):  Sanjay Salinas DPM    Procedure: Procedure(s):  HALLUS ABDUCTO VALGUS DEFORMITY CORRECTION OF LEFT FOOT AND POSSIBLE SECOND HAMMERTOE CORRECTION OF LEFT FOOT    Medications prior to admission:   Prior to Admission medications    Medication Sig Start Date End Date Taking? Authorizing Provider   aspirin 81 MG chewable tablet Take 1 tablet by mouth daily   Yes Jovana Montanez MD   calcium carbonate 600 MG TABS tablet Take 1 tablet by mouth daily   Yes Jovana Montanez MD   Cranberry 500 MG TABS Take by mouth   Yes Jovana Montanez MD   biotin (YUMVS/VITAJOY) 2500 MCG CHEW chewable gummie Take 1 each by mouth daily   Yes Jovana Montanez MD   vitamin E 1000 units capsule Take 1 capsule by mouth daily 180mg    Jovana Montanez MD   vitamin B-12 (CYANOCOBALAMIN) 500 MCG tablet Take 2 tablets by mouth daily    Jovana Montanez MD   azelastine (ASTELIN) 0.1 % nasal spray USE 2 SPRAYS NASALLY TWICE  DAILY 23   Cata Finch MD   levocetirizine (XYZAL) 5 MG tablet Take 1 tablet by mouth daily 23   Cata Finch MD   anastrozole (ARIMIDEX) 1 MG tablet Take 1 tablet by mouth daily    Automatic Reconciliation, Ar   benzonatate (TESSALON) 100 MG capsule Take 1 capsule by mouth 3 times daily as needed    Automatic Reconciliation, Ar   EPINEPHrine (SYMJEPI) 0.15 MG/0.3ML SOSY by Injection route.     Automatic Reconciliation, Ar   hydroCHLOROthiazide (MICROZIDE) 12.5 MG capsule Take 1 capsule by mouth daily  Patient not taking: Reported on 12/15/2023    Automatic Reconciliation, Ar   meclizine (ANTIVERT) 12.5 MG tablet Take 1 tablet by mouth 3 times daily as needed 3/24/23   Automatic Reconciliation, Ar   omeprazole (PRILOSEC) 20 MG delayed release capsule Take 1 capsule by asthma:                            Cardiovascular:    (+) hypertension:, hyperlipidemia        Rhythm: regular  Rate: normal                    Neuro/Psych:   Negative Neuro/Psych ROS              GI/Hepatic/Renal: Neg GI/Hepatic/Renal ROS            Endo/Other: Negative Endo/Other ROS                    Abdominal:             Vascular: negative vascular ROS. Other Findings:             Anesthesia Plan      MAC and TIVA     ASA 3     (Standard ASA monitors: continuous EKG, BP, HR, pulse oximeter, and capnography.)  Induction: intravenous. MIPS: Prophylactic antiemetics administered. Anesthetic plan and risks discussed with patient (and family, if present. ).                         Zoë Martinez MD   12/15/2023

## 2023-12-15 NOTE — PERIOP NOTE
Discharge instructions reviewed with pt, daughter, and , verbalize understanding. No complaints at this time. Crutches given. Discharged via wheelchair to private vehicle.

## 2023-12-15 NOTE — OP NOTE
[address] [place] [address] PODIATRY & FOOT SURGERY    Operative Note      Patient: David Ta  YOB: 1955  MRN: [id number]    Date of Procedure:   12/15/2023    Pre-Op Diagnosis Codes:     * Left foot pain [M79.672]     * Hammer toe of left foot [M20.42]     * Hallux abducto valgus, left [M20.12]    Post-Op Diagnosis:   Same       Procedure(s):  ARTHRODESIS OF THE LEFT FIRST METATARSOPHALANGEAL JOINT  SECOND HAMMERTOE CORRECTION OF LEFT FOOT  FLUOROSCOPIC EXAM LEFT FOOT    Surgeon(s):  Samira Mansfield DPM    Assistant:   Surgical Assistant: Asya Elder    Anesthesia: Choice    Estimated Blood Loss (mL):   Minimal    Complications:   None    Specimens:   None    Implants:  Implant Name Type Inv. Item Serial No.  Lot No. LRB No. Used Action   SCREW BNE L30MM DIA3MM MINI SHT THRD HD MONSTER - [id number] Screw/Plate/Nail/Mann SCREW BNE L30MM DIA3MM MINI SHT THRD HD MONSTER  PARAGON 28-WD  Left 1 Implanted   SCREW BNE L14MM DIA2.7MM NONLOCKING FOR R3CON PLATING SYS - [id number] Screw/Plate/Nail/Mann SCREW BNE L14MM DIA2.7MM NONLOCKING FOR R3CON PLATING SYS  [employer]AGON 28-WD  Left 1 Implanted   SCREW BNE L10MM DIA2. 7MM RACHNA FOR R3CON PLATING SYS GORILLA - [id number] Screw/Plate/Nail/Mann SCREW BNE L10MM DIA2. 7MM RACHNA FOR R3CON PLATING SYS GORILLA  PARAGON 28-WD  Left 2 Implanted   SCREW BNE L17MM [id number] NONLOCKING FOR R3CON PLATING SYS - [id number] Screw/Plate/Nail/Mann SCREW BNE L17MM [id number] NONLOCKING FOR R3CON PLATING SYS  PARAGON 28-WD  Left 1 Implanted   SCREW BNE L14MM DIA2. 7MM RACHNA FOR R3CON PLATING SYS GORILLA - [id number] Screw/Plate/Nail/Mann SCREW BNE L14MM DIA2. 7MM RACHNA FOR R3CON PLATING SYS GORILLA  PARAGON 28-WD  Left 2 Implanted     Drains:   None    Findings:   HAV deformity to left with elevation of the left second toe    Detailed Description of Procedure:   Pt was seen in the pre-operative holding area and all questions were answered and all concerns were

## 2023-12-26 ENCOUNTER — TELEPHONE (OUTPATIENT)
Age: 68
End: 2023-12-26

## 2023-12-26 DIAGNOSIS — Z98.890 POST-OPERATIVE STATE: Primary | ICD-10-CM

## 2023-12-26 NOTE — TELEPHONE ENCOUNTER
12/26/23 11:15AM Patient's spouse came into the office and is requesting a change from his spouse's walker to a scooter per her request as they gave her crutches from her surgery and the walker is tiresome--patient can be reached at [phone].

## 2024-01-02 ENCOUNTER — OFFICE VISIT (OUTPATIENT)
Age: 69
End: 2024-01-02

## 2024-01-02 VITALS
TEMPERATURE: 97.8 F | SYSTOLIC BLOOD PRESSURE: 157 MMHG | OXYGEN SATURATION: 98 % | HEIGHT: 65 IN | HEART RATE: 77 BPM | BODY MASS INDEX: 36.99 KG/M2 | WEIGHT: 222 LBS | DIASTOLIC BLOOD PRESSURE: 77 MMHG

## 2024-01-02 DIAGNOSIS — Z98.890 POST-OPERATIVE STATE: Primary | ICD-10-CM

## 2024-01-02 PROCEDURE — 99024 POSTOP FOLLOW-UP VISIT: CPT | Performed by: PODIATRIST

## 2024-01-02 NOTE — PROGRESS NOTES
Chief Complaint   Patient presents with    Post-Op Check     BP (!) 157/77 (Site: Left Upper Arm, Position: Sitting, Cuff Size: Large Adult)   Pulse 77   Temp 97.8 °F (36.6 °C) (Temporal)   Ht 1.638 m (5' 4.5\")   Wt 100.7 kg (222 lb)   SpO2 98%   BMI 37.52 kg/m²     1. Have you been to the ER, urgent care clinic since your last visit?  Hospitalized since your last visit?No    2. Have you seen or consulted any other health care providers outside of the [facility] since your last visit?  Include any pap smears or colon screening. No

## 2024-01-08 ENCOUNTER — OFFICE VISIT (OUTPATIENT)
Age: 69
End: 2024-01-08

## 2024-01-08 VITALS
SYSTOLIC BLOOD PRESSURE: 134 MMHG | DIASTOLIC BLOOD PRESSURE: 80 MMHG | OXYGEN SATURATION: 96 % | TEMPERATURE: 96.2 F | HEART RATE: 93 BPM

## 2024-01-08 DIAGNOSIS — Z98.890 POST-OPERATIVE STATE: Primary | ICD-10-CM

## 2024-01-08 PROCEDURE — 99024 POSTOP FOLLOW-UP VISIT: CPT | Performed by: PODIATRIST

## 2024-01-08 NOTE — PROGRESS NOTES
Chief Complaint   Patient presents with    Follow-up    Post-Op Check     1. Have you been to the ER, urgent care clinic since your last visit?  Hospitalized since your last visit?No    2. Have you seen or consulted any other health care providers outside of the [facility] since your last visit?  Include any pap smears or colon screening. No    /80 (Site: Left Upper Arm, Position: Sitting, Cuff Size: Medium Adult)   Pulse 93   Temp (!) 96.2 °F (35.7 °C) (Temporal)   SpO2 96%

## 2024-01-10 RX ORDER — AZELASTINE HYDROCHLORIDE 137 UG/1
SPRAY, METERED NASAL
Qty: 120 ML | Refills: 1 | Status: SHIPPED | OUTPATIENT
Start: 2024-01-10

## 2024-01-10 RX ORDER — MECLIZINE HCL 12.5 MG/1
12.5 TABLET ORAL 3 TIMES DAILY PRN
Qty: 90 TABLET | Refills: 1 | Status: SHIPPED | OUTPATIENT
Start: 2024-01-10

## 2024-01-10 RX ORDER — LEVOCETIRIZINE DIHYDROCHLORIDE 5 MG/1
5 TABLET, FILM COATED ORAL DAILY
Qty: 90 TABLET | Refills: 1 | Status: SHIPPED | OUTPATIENT
Start: 2024-01-10

## 2024-01-15 ENCOUNTER — HOSPITAL ENCOUNTER (OUTPATIENT)
Facility: HOSPITAL | Age: 69
Discharge: HOME | End: 2024-01-18
Payer: MEDICARE

## 2024-01-15 ENCOUNTER — OFFICE VISIT (OUTPATIENT)
Age: 69
End: 2024-01-15

## 2024-01-15 VITALS
HEART RATE: 84 BPM | DIASTOLIC BLOOD PRESSURE: 88 MMHG | TEMPERATURE: 97.3 F | OXYGEN SATURATION: 97 % | SYSTOLIC BLOOD PRESSURE: 134 MMHG

## 2024-01-15 DIAGNOSIS — Z98.890 POST-OPERATIVE STATE: Primary | ICD-10-CM

## 2024-01-15 DIAGNOSIS — Z98.890 POST-OPERATIVE STATE: ICD-10-CM

## 2024-01-15 PROCEDURE — 99024 POSTOP FOLLOW-UP VISIT: CPT | Performed by: PODIATRIST

## 2024-01-15 PROCEDURE — 73620 X-RAY EXAM OF FOOT: CPT

## 2024-01-15 NOTE — PROGRESS NOTES
Chief Complaint   Patient presents with    Post-Op Check   Patient states her FMLA will run out on 01/26/24. Are you going to extend her leave?  .  1. Have you been to the ER, urgent care clinic since your last visit?  Hospitalized since your last visit?No    2. Have you seen or consulted any other health care providers outside of the [facility] since your last visit?  Include any pap smears or colon screening. No    /88 (Site: Left Upper Arm, Position: Sitting, Cuff Size: Large Adult)   Pulse 84   Temp 97.3 °F (36.3 °C) (Temporal)   SpO2 97%

## 2024-01-17 NOTE — PROGRESS NOTES
[facility] PODIATRY & FOOT SURGERY      HPI:  Patient is being seen for second postoperative visit status post left foot surgery. Patient states she has had no pain since her last office visit.  She states she has elevated her left lower extremity as much as possible.  She is applying ice behind the knee.  She has kept her dressing/splint clean, dry, intact.  She states she has remained strict NWB to the LLE.  She denies any local/systemic signs infection.  She denies any other pedal complaints       ROS:  A complete review of systems is unremarkable outside of HPI       PE:  Upon removing the dressing/splint, surgical site noted to be intact with no signs of infection or dehiscence.       ASSESSMENT:  S/p - DOS 12/15/2023  ARTHRODESIS OF THE LEFT FIRST METATARSOPHALANGEAL JOINT  SECOND HAMMERTOE CORRECTION OF LEFT FOOT  FLUOROSCOPIC EXAM LEFT FOOT       PLAN:  Dressing/splint removed to the left lower extremity.  Sutures removed without incident.  A new, well-padded posterior splint was applied without incident.  Patient to continue strict nonweightbearing to the left lower extremity.  She is to keep her splint clean/dry/intact  She is to elevate her left lower extremity above the level of her heart for swelling/pain reduction.  She is to apply ice behind the left knee for the same.  She is to continue with her current oral analgesia          James Abbasi DPM, CWSP, AACFAS    [facility]  [address] [place] [address]  O: [phone]  F: [phone]    [facility]  [address]  [place] [address]  O: [phone]  F: [phone]    [facility]  [address]  [place] [address]  O: [phone]  F: [phone]    * Available via [employer] 24/7

## 2024-01-23 NOTE — PROGRESS NOTES
[facility] PODIATRY & FOOT SURGERY      HPI:  Patient is being seen for third postoperative visit status post left foot surgery. Patient states she has had no pain since her last office visit.  She states she has elevated her left lower extremity as much as possible.  She is applying ice behind the knee.  She has kept her dressing/splint clean, dry, intact.  She states she has remained strict NWB to the LLE.  She denies any local/systemic signs infection.  She denies any other pedal complaints       ROS:  A complete review of systems is unremarkable outside of HPI       PE:  Upon removing the dressing/splint, surgical site noted to be intact with no signs of infection or dehiscence.       ASSESSMENT:  S/p - DOS 12/15/2023  ARTHRODESIS OF THE LEFT FIRST METATARSOPHALANGEAL JOINT  SECOND HAMMERTOE CORRECTION OF LEFT FOOT  FLUOROSCOPIC EXAM LEFT FOOT       PLAN:  Dressing/splint removed to the left lower extremity.    Rx given for XR. She was also given a walking boot but told to remain strict nonweightbearing until the x-rays are reviewed.  If cleared, she may weight-bear as tolerated in the walking boot  Referral to physical therapy to eval and treat  She is to elevate her left lower extremity above the level of her heart for swelling/pain reduction.  She is to apply ice behind the left knee for the same.  She is to continue with her current oral analgesia    RTC (2) weeks          James Abbasi, MIKA, CWSP, AACFAS    [facility]  [address] [place] [address]  O: [phone]  F: [phone]    [facility]  [address]  [place] [address]  O: [phone]  F: [phone]    [facility]  [address]  [place] [address]  O: [phone]  F: [phone]    *

## 2024-01-29 ENCOUNTER — PATIENT MESSAGE (OUTPATIENT)
Age: 69
End: 2024-01-29

## 2024-01-29 ENCOUNTER — OFFICE VISIT (OUTPATIENT)
Age: 69
End: 2024-01-29

## 2024-01-29 VITALS
WEIGHT: 222 LBS | DIASTOLIC BLOOD PRESSURE: 78 MMHG | OXYGEN SATURATION: 97 % | HEART RATE: 87 BPM | SYSTOLIC BLOOD PRESSURE: 149 MMHG | TEMPERATURE: 98.2 F | BODY MASS INDEX: 37.9 KG/M2 | HEIGHT: 64 IN

## 2024-01-29 DIAGNOSIS — Z98.890 POST-OPERATIVE STATE: Primary | ICD-10-CM

## 2024-02-08 NOTE — PROGRESS NOTES
Chief Complaint   Patient presents with    Post-Op Check    Follow-up       1. Have you been to the ER, urgent care clinic since your last visit?  Hospitalized since your last visit?No    2. Have you seen or consulted any other health care providers outside of the [facility] since your last visit?  Include any pap smears or colon screening. Yes PCP    BP (!) 149/78 (Site: Left Upper Arm, Position: Sitting, Cuff Size: Medium Adult)   Pulse 87   Temp 98.2 °F (36.8 °C) (Temporal)   Ht 1.626 m (5' 4\")   Wt 100.7 kg (222 lb)   SpO2 97%   BMI 38.11 kg/m²     
[address] [place] [address]  O: [phone]  F: [phone]    [facility] [place]  [address]  [place] [address]  O: [phone]  F: [phone]    [facility] - [facility]  [address]  [place] [address]  O: [phone]  F: [phone]    * Available via [employer] 24/7

## 2024-04-11 RX ORDER — MECLIZINE HCL 12.5 MG/1
12.5 TABLET ORAL 3 TIMES DAILY PRN
Qty: 90 TABLET | Refills: 1 | Status: SHIPPED | OUTPATIENT
Start: 2024-04-11

## 2024-05-31 ENCOUNTER — OFFICE VISIT (OUTPATIENT)
Age: 69
End: 2024-05-31

## 2024-05-31 VITALS
SYSTOLIC BLOOD PRESSURE: 140 MMHG | DIASTOLIC BLOOD PRESSURE: 82 MMHG | OXYGEN SATURATION: 98 % | HEIGHT: 64 IN | HEART RATE: 84 BPM | WEIGHT: 222 LBS | BODY MASS INDEX: 37.9 KG/M2 | RESPIRATION RATE: 17 BRPM

## 2024-05-31 DIAGNOSIS — H81.02 MENIERE'S DISEASE, LEFT EAR: ICD-10-CM

## 2024-05-31 DIAGNOSIS — J30.9 ALLERGIC RHINITIS, UNSPECIFIED SEASONALITY, UNSPECIFIED TRIGGER: Primary | ICD-10-CM

## 2024-05-31 DIAGNOSIS — H61.23 IMPACTED CERUMEN, BILATERAL: ICD-10-CM

## 2024-05-31 RX ORDER — MECLIZINE HCL 12.5 MG/1
12.5 TABLET ORAL 3 TIMES DAILY PRN
Qty: 90 TABLET | Refills: 1 | Status: SHIPPED | OUTPATIENT
Start: 2024-05-31

## 2024-05-31 RX ORDER — BENZONATATE 200 MG/1
200 CAPSULE ORAL 3 TIMES DAILY PRN
Qty: 30 CAPSULE | Refills: 0 | Status: SHIPPED | OUTPATIENT
Start: 2024-05-31 | End: 2024-06-07

## 2024-05-31 RX ORDER — PREDNISONE 10 MG/1
TABLET ORAL
Qty: 20 TABLET | Refills: 0 | Status: SHIPPED | OUTPATIENT
Start: 2024-05-31

## 2024-05-31 ASSESSMENT — ENCOUNTER SYMPTOMS
NAUSEA: 0
SORE THROAT: 0
BACK PAIN: 0
SHORTNESS OF BREATH: 0
SINUS PAIN: 0
CHOKING: 0
EYE DISCHARGE: 0
EYE ITCHING: 0
VOICE CHANGE: 0
APNEA: 0
ABDOMINAL PAIN: 0
COUGH: 0
SINUS PRESSURE: 0
STRIDOR: 0
VOMITING: 0
TROUBLE SWALLOWING: 0
PHOTOPHOBIA: 0
WHEEZING: 0

## 2024-05-31 NOTE — PROGRESS NOTES
still with some lingering symptoms.  Still with some cough with clear, thick sputum. She went to urgent care and was given OTCs to take.  Still on xyzal, flonase, dyazide.    Review of Systems  Review of Systems   Constitutional:  Negative for appetite change, chills, fatigue and fever.   HENT:  Positive for tinnitus. Negative for congestion, ear discharge, ear pain, nosebleeds, postnasal drip, sinus pressure, sinus pain, sneezing, sore throat, trouble swallowing and voice change.    Eyes:  Negative for photophobia, discharge, itching and visual disturbance.   Respiratory:  Negative for apnea, cough, choking, shortness of breath, wheezing and stridor.    Cardiovascular:  Negative for chest pain and palpitations.   Gastrointestinal:  Negative for abdominal pain, nausea and vomiting.   Endocrine: Negative for cold intolerance and heat intolerance.   Genitourinary:  Negative for difficulty urinating and dysuria.   Musculoskeletal:  Negative for arthralgias, back pain, gait problem, myalgias, neck pain and neck stiffness.   Skin:  Negative for rash and wound.   Allergic/Immunologic: Positive for environmental allergies. Negative for food allergies.   Neurological:  Negative for dizziness, speech difficulty, light-headedness and headaches.   Hematological:  Negative for adenopathy. Does not bruise/bleed easily.   Psychiatric/Behavioral:  Negative for confusion and sleep disturbance. The patient is not nervous/anxious.         Past Medical History:   Diagnosis Date    Allergic rhinitis due to pollen 7/18/2020    Asthma     Asymmetrical sensorineural hearing loss 7/18/2020    Cancer (HCC) 2019    breast right    History of multiple allergies     Hypertension     Hypertrophy of nasal turbinates 7/18/2020    Impacted cerumen 7/18/2020    Meniere's disease 7/18/2020    Migraine with vertigo 7/18/2020    pt states no migraines    Nausea 7/18/2020    Otalgia 7/18/2020     Prior to Admission medications    Medication Sig Start

## 2024-07-09 RX ORDER — LEVOCETIRIZINE DIHYDROCHLORIDE 5 MG/1
5 TABLET, FILM COATED ORAL DAILY
Qty: 90 TABLET | Refills: 1 | Status: SHIPPED | OUTPATIENT
Start: 2024-07-09

## 2025-01-13 RX ORDER — LEVOCETIRIZINE DIHYDROCHLORIDE 5 MG/1
5 TABLET, FILM COATED ORAL DAILY
Qty: 90 TABLET | Refills: 1 | Status: SHIPPED | OUTPATIENT
Start: 2025-01-13

## 2025-01-15 ENCOUNTER — OFFICE VISIT (OUTPATIENT)
Age: 70
End: 2025-01-15

## 2025-01-15 VITALS
HEART RATE: 78 BPM | RESPIRATION RATE: 16 BRPM | BODY MASS INDEX: 36.67 KG/M2 | WEIGHT: 214.8 LBS | OXYGEN SATURATION: 99 % | DIASTOLIC BLOOD PRESSURE: 82 MMHG | HEIGHT: 64 IN | SYSTOLIC BLOOD PRESSURE: 122 MMHG

## 2025-01-15 DIAGNOSIS — H61.22 IMPACTED CERUMEN, LEFT EAR: ICD-10-CM

## 2025-01-15 DIAGNOSIS — H81.02 MENIERE'S DISEASE, LEFT EAR: ICD-10-CM

## 2025-01-15 DIAGNOSIS — J30.9 ALLERGIC RHINITIS, UNSPECIFIED SEASONALITY, UNSPECIFIED TRIGGER: Primary | ICD-10-CM

## 2025-01-15 ASSESSMENT — ENCOUNTER SYMPTOMS
VOICE CHANGE: 0
VOMITING: 0
ABDOMINAL PAIN: 0
COUGH: 0
SINUS PRESSURE: 0
BACK PAIN: 0
STRIDOR: 0
APNEA: 0
CHOKING: 0
EYE DISCHARGE: 0
EYE ITCHING: 0
SHORTNESS OF BREATH: 0
SINUS PAIN: 0
WHEEZING: 0
PHOTOPHOBIA: 0
NAUSEA: 0
SORE THROAT: 0
TROUBLE SWALLOWING: 0

## 2025-01-15 NOTE — PROGRESS NOTES
capsule by mouth daily  Patient not taking: Reported on 12/15/2023    Automatic Reconciliation, Ar   rivaroxaban (XARELTO) 10 MG TABS tablet Take by mouth daily  Patient not taking: Reported on 12/15/2023    Automatic Reconciliation, Ar        Objective:     /82 (Site: Left Upper Arm, Position: Sitting, Cuff Size: Large Adult)   Pulse 78   Resp 16   Ht 1.626 m (5' 4\")   Wt 97.4 kg (214 lb 12.8 oz)   SpO2 99%   BMI 36.87 kg/m²      Physical Exam  Constitutional:       General: She is not in acute distress.     Appearance: Normal appearance. She is obese.   HENT:      Head: Normocephalic and atraumatic.      Jaw: No trismus or tenderness.      Salivary Glands: Right salivary gland is not diffusely enlarged. Left salivary gland is not diffusely enlarged.      Right Ear: Hearing, tympanic membrane, ear canal and external ear normal.      Left Ear: Hearing, tympanic membrane, ear canal and external ear normal. There is impacted cerumen.      Nose: Mucosal edema present. No septal deviation.      Right Turbinates: Pale. Not enlarged.      Left Turbinates: Pale. Not enlarged.      Right Sinus: No maxillary sinus tenderness.      Left Sinus: No maxillary sinus tenderness.      Mouth/Throat:      Lips: Pink.      Mouth: Mucous membranes are moist. No oral lesions.      Tongue: No lesions.      Palate: No mass.      Pharynx: Uvula midline. No posterior oropharyngeal erythema.      Tonsils: No tonsillar exudate.   Eyes:      Conjunctiva/sclera: Conjunctivae normal.      Pupils: Pupils are equal, round, and reactive to light.   Neck:      Thyroid: No thyroid mass.      Trachea: Trachea and phonation normal.   Cardiovascular:      Rate and Rhythm: Normal rate and regular rhythm.   Pulmonary:      Effort: Pulmonary effort is normal.      Breath sounds: No stridor.   Musculoskeletal:      Cervical back: Normal range of motion and neck supple.   Lymphadenopathy:      Cervical: No cervical adenopathy.   Neurological:

## 2025-04-16 RX ORDER — AZELASTINE HYDROCHLORIDE 137 UG/1
SPRAY, METERED NASAL
Qty: 120 ML | Refills: 1 | Status: SHIPPED | OUTPATIENT
Start: 2025-04-16

## (undated) DEVICE — MINOR EXTREMITY PACK: Brand: MEDLINE INDUSTRIES, INC.

## (undated) DEVICE — BONE FENESTRATION PERFORATOR: Brand: BABY GORILLA®/GORILLA® PLATING SYSTEM

## (undated) DEVICE — SOLUTION IRRIG 500ML 0.9% SOD CHLO USP POUR PLAS BTL

## (undated) DEVICE — GLOVE ORANGE PI 7 1/2   MSG9075

## (undated) DEVICE — STOCKINETTE,DOUBLE PLY,6X48,STERILE: Brand: MEDLINE

## (undated) DEVICE — GARMENT,MEDLINE,DVT,INT,CALF,MED, GEN2: Brand: MEDLINE

## (undated) DEVICE — PADDING CAST W6INXL4YD SYNTHETIC NATURAL PROTOUCH

## (undated) DEVICE — PADDING CAST 4 INX5 YD STRL

## (undated) DEVICE — DRILL,  2.1 X 120MM, CANNULATED, AO: Brand: MONSTER SCREW SYSTEM

## (undated) DEVICE — DRAPE EQUIP C ARM 74X42 IN MOB XR W/ TIE RUBBER BND LF

## (undated) DEVICE — DRAPE,ORTHOMAX,EXTREMITY: Brand: MEDLINE

## (undated) DEVICE — SCREWDRIVER SURG STD FOR CAPPING K WIRE W SER PIN BALL

## (undated) DEVICE — SUTURE MCRYL + SZ 3-0 L27IN ABSRB UD PS1 L24MM 3/8 CIR REV MCP936H

## (undated) DEVICE — SOUTHSIDE TURNOVER: Brand: MEDLINE INDUSTRIES, INC.

## (undated) DEVICE — SPLINT CAST W3XL35IN FBRGLS PD LTWT FAST SET UP SAFETYSPLNT

## (undated) DEVICE — CUFF REPROC TRNQT DPSB W/PLC RED 18IN

## (undated) DEVICE — GLOVE SURG SZ 8 L12IN FNGR THK79MIL GRN LTX FREE

## (undated) DEVICE — OLIVE WIRE, SMOOTH, 1.4MM: Brand: BABY GORILLA/GORILLA PLATING SYSTEM

## (undated) DEVICE — DRILL, 2.0 X 110MM, SOLID, MEASURING, AO: Brand: BABY GORILLA®/GORILLA® PLATING SYSTEM

## (undated) DEVICE — 60-7070-104 TRNQT,DPSB,PLC GREEN: Brand: MEDLINE RENEWAL

## (undated) DEVICE — SUTURE ETHLN SZ 3-0 L18IN NONABSORBABLE BLK FS-1 L24MM 3/8 663H

## (undated) DEVICE — SOLUTION SURG PREP 26 CC PURPREP

## (undated) DEVICE — K-WIRE, SINGLE ENDED TROCAR TIP, SMOOTH, 1.1MM X 150 MM
Type: IMPLANTABLE DEVICE | Site: FOOT | Status: NON-FUNCTIONAL
Brand: MONSTER SCREW SYSTEM
Removed: 2023-12-15

## (undated) DEVICE — BASIC SINGLE BASIN-LF: Brand: MEDLINE INDUSTRIES, INC.

## (undated) DEVICE — SPONGE GZ W4XL4IN COT 12 PLY TYP VII WVN C FLD DSGN STERILE

## (undated) DEVICE — INTENDED FOR TISSUE SEPARATION, AND OTHER PROCEDURES THAT REQUIRE A SHARP SURGICAL BLADE TO PUNCTURE OR CUT.: Brand: BARD-PARKER ® STAINLESS STEEL BLADES

## (undated) DEVICE — Device: Brand: LIGHT GUARD™ FLEXIBLE LIGHT HANDLE COVER (2 EACH/PKG)

## (undated) DEVICE — PREP PAD BNS: Brand: CONVERTORS

## (undated) DEVICE — BANDAGE,GAUZE,BULKEE II,4.5"X4.1YD,STRL: Brand: MEDLINE